# Patient Record
Sex: MALE | Race: WHITE
[De-identification: names, ages, dates, MRNs, and addresses within clinical notes are randomized per-mention and may not be internally consistent; named-entity substitution may affect disease eponyms.]

---

## 2018-06-05 ENCOUNTER — HOSPITAL ENCOUNTER (EMERGENCY)
Dept: HOSPITAL 76 - ED | Age: 23
Discharge: HOME | End: 2018-06-05
Payer: COMMERCIAL

## 2018-06-05 VITALS — DIASTOLIC BLOOD PRESSURE: 85 MMHG | SYSTOLIC BLOOD PRESSURE: 145 MMHG

## 2018-06-05 DIAGNOSIS — R11.2: Primary | ICD-10-CM

## 2018-06-05 DIAGNOSIS — H66.001: ICD-10-CM

## 2018-06-05 PROCEDURE — 99283 EMERGENCY DEPT VISIT LOW MDM: CPT

## 2018-06-05 NOTE — ED PHYSICIAN DOCUMENTATION
PD HPI NVD





- Stated complaint


Stated Complaint: NAUSEA





- Chief complaint


Chief Complaint: General





- History obtained from


History obtained from: Patient





- History of Present Illness


Timing - onset: How many days ago (3-4)


Timing - duration: Days (3-4)


Timing - details: Gradual onset, Still present


Associated symptoms: Other (has had congestion and sore throat, now ear pain 

and today vomiting.)


Contributing factors: No: Sick contact, Bad food, Travel


Worsened by: Moving


Similar symptoms before: Has not had sx before


Recently seen: Not recently seen





Review of Systems


Constitutional: reports: Myalgias.  denies: Fever, Chills


Ears: reports: Ear pain (right)


Nose: reports: Rhinorrhea / runny nose, Congestion


Throat: reports: Sore throat


Respiratory: denies: Cough


GI: reports: Nausea, Vomiting.  denies: Abdominal Pain, Diarrhea


Skin: denies: Rash, Lesions


Neurologic: denies: Altered mental status, Headache





PD PAST MEDICAL HISTORY





- Past Medical History


Past Medical History: No





- Past Surgical History


Past Surgical History: Yes





- Present Medications


Home Medications: 


 Ambulatory Orders











 Medication  Instructions  Recorded  Confirmed


 


Amoxicillin 500 mg PO TID #20 capsule 06/05/18 


 


Cetirizine [ZyrTEC] 10 mg PO DAILY #20 tablet 06/05/18 


 


Dexamethasone [Decadron] 4 mg PO DAILY #5 tablet 06/05/18 


 


Ondansetron Odt [Zofran] 4 mg TL Q6H PRN #15 tablet 06/05/18 














- Allergies


Allergies/Adverse Reactions: 


 Allergies











Allergy/AdvReac Type Severity Reaction Status Date / Time


 


clindamycin Allergy Severe Nausea Verified 04/28/17 16:59














- Social History


Does the pt smoke?: No


Smoking Status: Never smoker


Does the pt drink ETOH?: Yes


ETOH Use: Beer


Does the pt have substance abuse?: No





- Immunizations


Immunizations are current?: Yes





- POLST


Patient has POLST: No





PD ED PE NORMAL





- Vitals


Vital signs reviewed: Yes





- General


General: Alert and oriented X 3, No acute distress, Well developed/nourished





- HEENT


HEENT: PERRL, EOMI (no nystagmus), Pharynx benign.  No: Ears normal (left is 

good; right with redness and bulging. Canal appears okay. )





- Neck


Neck: Supple, no meningeal sign





- Cardiac


Cardiac: RRR, No murmur





- Respiratory


Respiratory: Clear bilaterally





- Abdomen


Abdomen: Soft, Non tender





- Derm


Derm: Normal color, Warm and dry, No rash





- Neuro


Neuro: Alert and oriented X 3, No motor deficit, Normal speech





Results





- Vitals


Vitals: 


 Vital Signs - 24 hr











  06/05/18





  13:43


 


Temperature 36.4 C L


 


Heart Rate 102 H


 


Respiratory 19





Rate 


 


Blood Pressure 145/85 H


 


O2 Saturation 98








 Oxygen











O2 Source                      Room air

















PD MEDICAL DECISION MAKING





- ED course


Complexity details: considered differential, d/w patient





Departure





- Departure


Disposition: 01 Home, Self Care


Clinical Impression: 


Nausea & vomiting


Qualifiers:


 Vomiting type: unspecified Vomiting Intractability: non-intractable Qualified 

Code(s): R11.2 - Nausea with vomiting, unspecified





Right otitis media


Qualifiers:


 Otitis media type: suppurative Chronicity: acute Recurrence: not specified as 

recurrent Spontaneous tympanic membrane rupture: without spontaneous rupture 

Qualified Code(s): H66.001 - Acute suppurative otitis media without spontaneous 

rupture of ear drum, right ear





Condition: Stable


Record reviewed to determine appropriate education?: Yes


Instructions:  ED Otitis Media Acute Adult


Prescriptions: 


Amoxicillin 500 mg PO TID #20 capsule


Cetirizine [ZyrTEC] 10 mg PO DAILY #20 tablet


Dexamethasone [Decadron] 4 mg PO DAILY #5 tablet


Ondansetron Odt [Zofran] 4 mg TL Q6H PRN #15 tablet


 PRN Reason: Nausea / Vomiting


Comments: 


The underlying process may be a viral illness or possibly allergies with the 

congestion and ear pain.  It does look like it led into an ear infection on the 

right side and is likely contributing to the nausea from that.  Rest for 1-2 

days.  Drink lots of fluids.  Ondansetron if needed for nausea.  Decadron 

steroid anti-inflammatory will help with the inflammation and symptoms.  

Amoxicillin 3 times a day for a week for the infection.  Cetirizine daily for 

the next couple of weeks for possible underlying allergies.


Forms:  Activity restrictions


Discharge Date/Time: 06/05/18 15:08

## 2019-07-05 ENCOUNTER — HOSPITAL ENCOUNTER (EMERGENCY)
Dept: HOSPITAL 76 - ED | Age: 24
Discharge: HOME | End: 2019-07-05
Payer: SELF-PAY

## 2019-07-05 VITALS — DIASTOLIC BLOOD PRESSURE: 87 MMHG | SYSTOLIC BLOOD PRESSURE: 141 MMHG

## 2019-07-05 DIAGNOSIS — J01.00: Primary | ICD-10-CM

## 2019-07-05 PROCEDURE — 99283 EMERGENCY DEPT VISIT LOW MDM: CPT

## 2019-07-05 NOTE — ED PHYSICIAN DOCUMENTATION
PD HPI HEENT





- Stated complaint


Stated Complaint: COUGHING UP BLOOD





- Chief complaint


Chief Complaint: Heent





- History obtained from


History obtained from: Patient





- History of Present Illness


Timing - onset: Other (He has been sick for about 5 days with severe right-sided

sinus pressure radiating to the, productive cough with mild shortness of breath 

and sputum that is slightly blood-streaked starting today.  He vacillates as to 

whether or not he is had fevers.)





Review of Systems


Constitutional: reports: Chills, Fatigue.  denies: Sweats


Ears: reports: Ear pain


Nose: reports: Rhinorrhea / runny nose, Congestion, Sinus pressure / pain.  

denies: Epistaxis


Throat: denies: Sore throat





PD PAST MEDICAL HISTORY





- Past Medical History


Past Medical History: No





- Past Surgical History


Past Surgical History: Yes





- Present Medications


Home Medications: 


                                Ambulatory Orders











 Medication  Instructions  Recorded  Confirmed


 


Albuterol Sulf [Ventolin Hfa 1 - 2 puffs INH Q4HR PRN #1 inhaler 07/05/19 





Inhaler]   


 


Amox/Clav 875/125 [Augmentin] 1 each PO Q12H #20 tablet 07/05/19 


 


Guaifenesin/Pseudoephedrne HCl 1 each PO BID PRN #20 tab.er.12h 07/05/19 





[Mucinex D -60 mg Tablet]   


 


predniSONE [Deltasone] 20 mg PO [id number] #21 tab 07/05/19 














- Allergies


Allergies/Adverse Reactions: 


                                    Allergies











Allergy/AdvReac Type Severity Reaction Status Date / Time


 


clindamycin Allergy Severe Nausea Verified 07/05/19 10:52














- Social History


Does the pt smoke?: No


Smoking Status: Never smoker


Does the pt drink ETOH?: Yes


Does the pt have substance abuse?: No





- Immunizations


Immunizations are current?: Yes





- POLST


Patient has POLST: No





PD ED PE NORMAL





- Vitals


Vital signs reviewed: Yes





- General


General: Alert and oriented X 3, No acute distress





- HEENT


HEENT: Other (Moderate right maxillary sinus tenderness, right TM is retracted 

without otitis.  Oropharynx is normal.)





- Neck


Neck: Supple, no meningeal sign, No bony TTP





- Cardiac


Cardiac: RRR, No murmur





- Respiratory


Respiratory: No respiratory distress, Other (Mildly wheezy throughout with good 

air motion, nonlabored)





- Abdomen


Abdomen: Non tender





- Neuro


Neuro: Alert and oriented X 3, Normal speech





Results





- Vitals


Vitals: 


                               Vital Signs - 24 hr











  07/05/19





  10:50


 


Temperature 36.5 C


 


Heart Rate 71


 


Respiratory 16





Rate 


 


Blood Pressure 141/87 H


 


O2 Saturation 98








                                     Oxygen











O2 Source                      Room air

















PD MEDICAL DECISION MAKING





- ED course


ED course: 





He does fit IDSA criteria for antibiotic treatment for sinusitis.





Departure





- Departure


Disposition: 01 Home, Self Care


Clinical Impression: 


Sinusitis


Qualifiers:


 Sinusitis location: maxillary Chronicity: acute Recurrence: non-recurrent 

Qualified Code(s): J01.00 - Acute maxillary sinusitis, unspecified





Condition: Good


Record reviewed to determine appropriate education?: Yes


Health Concerns: 


sinus infection, mild hemoptysis with clinical findings of bronchitis


Plan of Treatment: 


Antibiotics, steroids, and inhaler and a decongestant.  Follow-up with your 

doctor in 1 week if not better.


Care Goals: 


improvement


Assessment: 


as above


Instructions:  ED Sinusitis Abx Tx


Prescriptions: 


Albuterol Sulf [Ventolin Hfa Inhaler] 1 - 2 puffs INH Q4HR PRN #1 inhaler


 PRN Reason: Shortness Of Air/Wheezing


Amox/Clav 875/125 [Augmentin] 1 each PO Q12H #20 tablet


Guaifenesin/Pseudoephedrne HCl [Mucinex D -60 mg Tablet] 1 each PO BID PRN

#20 tab.er.12h


 PRN Reason: congestion


predniSONE [Deltasone] 20 mg PO [id number] #21 tab

## 2022-07-25 ENCOUNTER — HOSPITAL ENCOUNTER (EMERGENCY)
Dept: HOSPITAL 76 - ED | Age: 27
Discharge: HOME | End: 2022-07-25
Payer: COMMERCIAL

## 2022-07-25 VITALS — DIASTOLIC BLOOD PRESSURE: 89 MMHG | SYSTOLIC BLOOD PRESSURE: 145 MMHG

## 2022-07-25 DIAGNOSIS — M70.41: ICD-10-CM

## 2022-07-25 DIAGNOSIS — M25.461: Primary | ICD-10-CM

## 2022-07-25 PROCEDURE — 99282 EMERGENCY DEPT VISIT SF MDM: CPT

## 2022-07-25 PROCEDURE — 20610 DRAIN/INJ JOINT/BURSA W/O US: CPT

## 2022-07-25 NOTE — EXTERNAL MEDICAL SUMMARY RPT
Continuity of Care Document

                            Created on:2022



Patient:VIJAYA CAT

Sex:Male

:1995

External Reference #:[id number]





Demographics







                          Address                   [address] [address]

 

                          Phone                     Unavailable

 

                          Preferred Language        English

 

                          Marital Status            Never 

 

                          Amish Affiliation     Unknown

 

                          Race                      Unknown

 

                          Ethnic Group              Unknown









Author







                          Organization              [facility]

 

                          Address                   [address] [place] [address]

 

                          Phone                     [phone]









Allergies

No information.



Encounters

No information.



Functional Status

No information.



Immunizations

No information.



Medications

No information.



Problems

No information.



Procedures







                     date                description         facility

 

                     [id number]  [facility]







Results/Labs







           test      date      author    facility  value     unit      interpret

ation









                                         Result panel 1









           (unknown)  (no       (unknown)  (unknown)  (no value)  (units    (unk

nown)



                    date)                                   unknown)  

 

           (unknown)  (no       (unknown)  (unknown)  (no value)  (units    (unk

nown)



                    date)                                   unknown)  

 

           (unknown)  (no       (unknown)  (unknown)  [place]  (units    (

unknown)



                    date)                         [id number]     unknown)  

 

           (unknown)  (no       (unknown)  (unknown)  [facility]     (units    (unkno

wn)



                    date)                                   unknown)  

 

           (unknown)  (no       (unknown)  (unknown)  [facility]  (units    

(unknown)



                    date)                                   unknown)  

 

           (unknown)  (no       (unknown)  (unknown)  [facility]  (units    

(unknown)



                    date)                         Visit     unknown)  

 

           (unknown)  (no       (unknown)  (unknown)  (no value)  (units    (unk

nown)



                    date)                                   unknown)  

 

           (unknown)  (no       (unknown)  (unknown)  [id number]  (units    (unkn

own)



                    date)                                   unknown)  

 

           (unknown)  (no       (unknown)  (unknown)  22  (units    (unkno

wn)



                    date)                                   unknown)  

 

           (unknown)  (no       (unknown)  (unknown)  25 Y/O M  (units    (unkno

wn)



                    date)                         presents to unknown)  



                                                  clinic as new           



                                                  patient for           



                                                  infertility           

 

           (unknown)  (no       (unknown)  (unknown)  Age/Sex: 26 / M  (units   

 (unknown)



                    date)                           Date of unknown)  



                                                  Service:            

 

           (unknown)  (no       (unknown)  (unknown)  Allergies  (units    (unkn

own)



                    date)                                   unknown)  

 

           (unknown)  (no       (unknown)  (unknown)  Attending Dr:  (units    (

unknown)



                    date)                         Rosales Xiao MD unknown)  

 

           (unknown)  (no       (unknown)  (unknown)  : 1995  (units   

 (unknown)



                    date)                           Acct:[id number] unknown)  

 

           (unknown)  (no       (unknown)  (unknown)  Dept at   (units    (unkno

wn)



                    date)                         [phone] unknown)  

 

           (unknown)  (no       (unknown)  (unknown)  Documented By:  (units    

(unknown)



                    date)                         Rosales Xiao MD unknown)  



                                                   22 1105           

 

           (unknown)  (no       (unknown)  (unknown)  History of  (units    (unk

nown)



                    date)                         pyloric stenosis unknown)  

 

           (unknown)  (no       (unknown)  (unknown)  Hx of thyroid  (units    (

unknown)



                    date)                         cyst      unknown)  

 

           (unknown)  (no       (unknown)  (unknown)  Intake    (units    (unkno

wn)



                    date)                                   unknown)  

 

           (unknown)  (no       (unknown)  (unknown)  Intake Note:  (units    (u

nknown)



                    date)                                   unknown)  

 

           (unknown)  (no       (unknown)  (unknown)  Intake performed  (units  

  (unknown)



                    date)                         by:       unknown)  



                                                  Gretel Tubbs           

 

           (unknown)  (no       (unknown)  (unknown)  Intake- [facility]  (units  

  (unknown)



                    date)                         Staff     unknown)  

 

           (unknown)  (no       (unknown)  (unknown)  Loc: [facility]  (units    (unkno

wn)



                    date)                                   unknown)  

 

           (unknown)  (no       (unknown)  (unknown)  Medical History  (units   

 (unknown)



                    date)                         (Updated 22 unknown)  



                                                  @ 10:57 by Gretel Tubbs RN)           

 

           (unknown)  (no       (unknown)  (unknown)  Medications  (units    (un

known)



                    date)                                   unknown)  

 

           (unknown)  (no       (unknown)  (unknown)  PFSH      (units    (unkno

wn)



                    date)                                   unknown)  

 

           (unknown)  (no       (unknown)  (unknown)  Patient denies  (units    

(unknown)



                    date)                         medical problems unknown)  

 

           (unknown)  (no       (unknown)  (unknown)  Patient:  (units    (unkno

wn)



                    date)                         Annia,Vijaya J unknown)  



                                                     MR#: M           

 

           (unknown)  (no       (unknown)  (unknown)  Reason For Visit  (units  

  (unknown)



                    date)                                   unknown)  

 

           (unknown)  (no       (unknown)  (unknown)  Signed By:  (units    (unk

nown)



                    date)                                   unknown)  

 

           (unknown)  (no       (unknown)  (unknown)  Smoking Status:  (units   

 (unknown)



                    date)                         Never smoker unknown)  

 

           (unknown)  (no       (unknown)  (unknown)  Smoking Status:  (units   

 (unknown)



                    date)                         Never smoker unknown)  

 

           (unknown)  (no       (unknown)  (unknown)  Social History  (units    

(unknown)



                    date)                         (Updated 22 unknown)  



                                                  @ 11:00 by Gretel Tubbs RN)           

 

           (unknown)  (no       (unknown)  (unknown)  This note may  (units    (

unknown)



                    date)                         have been all or unknown)  



                                                  partially           



                                                  generated using           



                                                  voice recognition           

 

           (unknown)  (no       (unknown)  (unknown)  Tobacco Status  (units    

(unknown)



                    date)                                   unknown)  

 

           (unknown)  (no       (unknown)  (unknown)  Type(s) of  (units    (unk

nown)



                    date)                         exercise: unknown)  



                                                  independent           



                                                  ambulation           

 

           (unknown)  (no       (unknown)  (unknown)  Visit Reasons:  (units    

(unknown)



                    date)                         NP infertility unknown)  

 

           (unknown)  (no       (unknown)  (unknown)  [History  (units    (unkno

wn)



                    date)                         Confirmed unknown)  



                                                  22]           

 

           (unknown)  (no       (unknown)  (unknown)  alcohol intake:  (units   

 (unknown)



                    date)                         current   unknown)  

 

           (unknown)  (no       (unknown)  (unknown)  caffeine:  Yes  (units    

(unknown)



                    date)                                   unknown)  

 

           (unknown)  (no       (unknown)  (unknown)  clindamycin  (units    (un

known)



                    date)                         Allergy (Verified unknown)  



                                                  20 14:48)           

 

           (unknown)  (no       (unknown)  (unknown)  have occurred.  (units    

(unknown)



                    date)                         If there are any unknown)  



                                                  questions, please           



                                                  contact the           



                                                  Medical Records           

 

           (unknown)  (no       (unknown)  (unknown)  marital status:  (units   

 (unknown)



                    date)                            unknown)  

 

           (unknown)  (no       (unknown)  (unknown)  may occur.  (units    (unk

nown)



                    date)                         Occasional unknown)  



                                                  wrong-word or           



                                                  'sound-alike'           



                                                  substitutions may           



                                                  have                

 

           (unknown)  (no       (unknown)  (unknown)  multivit-min-fol  (units  

  (unknown)



                    date)                         ic-vit K-lycop unknown)  



                                                  [One-A-Day Men's           



                                                  Multivitamin] PO           



                                                  22            

 

           (unknown)  (no       (unknown)  (unknown)  number of  (units    (unkn

own)



                    date)                         children:  0 unknown)  

 

           (unknown)  (no       (unknown)  (unknown)  occupational  (units    (u

nknown)



                    date)                         status:  employed unknown)  

 

           (unknown)  (no       (unknown)  (unknown)  occurred due to  (units   

 (unknown)



                    date)                         the inherent unknown)  



                                                  limitations of           



                                                  voice recognition           



                                                  software. Please           

 

           (unknown)  (no       (unknown)  (unknown)  read the note  (units    (

unknown)



                    date)                         carefully and unknown)  



                                                  recognize, using           



                                                  context, where           



                                                  these               



                                                  substitutions           

 

           (unknown)  (no       (unknown)  (unknown)  software.  (units    (unkn

own)



                    date)                         Although every unknown)  



                                                  effort is made to           



                                                  edit content,           



                                                  transcription           



                                                  errors              









                                         Result panel 2









           (unknown)  (no       (unknown)  (unknown)  (no value)  (units    (unk

nown)



                    date)                                   unknown)  

 

           (unknown)  (no       (unknown)  (unknown)  Orders:   (units    (unkno

wn)



                    date)                                   unknown)  

 

           (unknown)  (no       (unknown)  (unknown)  (no value)  (units    (unk

nown)



                    date)                                   unknown)  

 

           (unknown)  (no       (unknown)  (unknown)  (no value)  (units    (unk

nown)



                    date)                                   unknown)  

 

           (unknown)  (no       (unknown)  (unknown)  22  (units    (unkno

wn)



                    date)                                   unknown)  

 

           (unknown)  (no       (unknown)  (unknown)  11:15     (units    (unkno

wn)



                    date)                                   unknown)  

 

           (unknown)  (no       (unknown)  (unknown)  [place]  (units    (

unknown)



                    date)                         [id number]     unknown)  

 

           (unknown)  (no       (unknown)  (unknown)  [facility]     (units    (unkno

wn)



                    date)                                   unknown)  

 

           (unknown)  (no       (unknown)  (unknown)  [facility]  (units    

(unknown)



                    date)                                   unknown)  

 

           (unknown)  (no       (unknown)  (unknown)  [facility]  (units    

(unknown)



                    date)                         Visit     unknown)  

 

           (unknown)  (no       (unknown)  (unknown)  (no value)  (units    (unk

nown)



                    date)                                   unknown)  

 

           (unknown)  (no       (unknown)  (unknown)  Urine Appearance  (units  

  (unknown)



                    date)                            Clear     Last unknown)  



                                                  Edit by Gabriela Schwartz RN on           



                                                  22 11:14           

 

           (unknown)  (no       (unknown)  (unknown)  Urine Bilirubin  (units   

 (unknown)



                    date)                         Negative     Last unknown)  



                                                  Edit by Gabriela Schwartz RN on           



                                                  22 11:14           

 

           (unknown)  (no       (unknown)  (unknown)  Urine Blood  (units    (un

known)



                    date)                         Negative     Last unknown)  



                                                  Edit by Gabriela Schwartz RN on           



                                                  22 11:14           

 

           (unknown)  (no       (unknown)  (unknown)  Urine Color  (units    (un

known)



                    date)                           Yellow     Last unknown)  



                                                  Edit by Gabriela Schwartz RN on           



                                                  22 11:14           

 

           (unknown)  (no       (unknown)  (unknown)  Urine Glucose  (units    (

unknown)



                    date)                         Negative  mg/dL unknown)  



                                                  Last Edit by           



                                                  Gabriela Schwartz RN on 22           



                                                  11:                 

 

           (unknown)  (no       (unknown)  (unknown)  Urine Ketones  (units    (

unknown)



                    date)                         Negative     Last unknown)  



                                                  Edit by Gabriela Schwartz RN on           



                                                  22 11:14           

 

           (unknown)  (no       (unknown)  (unknown)  Urine Leukocyte  (units   

 (unknown)



                    date)                         Esterase Negative unknown)  



                                                      Last Edit by           



                                                  Gabriela Schwartz RN on            

 

           (unknown)  (no       (unknown)  (unknown)  Urine Nitrate  (units    (

unknown)



                    date)                         Negative     Last unknown)  



                                                  Edit by Gabriela Schwartz RN on           



                                                  22 11:14           

 

           (unknown)  (no       (unknown)  (unknown)  Urine Protein  (units    (

unknown)



                    date)                         Negative     Last unknown)  



                                                  Edit by Gabriela Schwartz RN on           



                                                  22 11:14           

 

           (unknown)  (no       (unknown)  (unknown)  Urine Specific  (units    

(unknown)



                    date)                         Gravity 1.015 unknown)  



                                                  Last Edit by           



                                                  Gabriela Schwartz RN on 22 11           

 

           (unknown)  (no       (unknown)  (unknown)  Urine     (units    (unkno

wn)



                    date)                         Urobilinogen - unknown)  



                                                  0.2 mg/dL           



                                                  Last Edit by           



                                                  Gabriela Schwartz RN on            

 

           (unknown)  (no       (unknown)  (unknown)  Urine pH    7.5  (units   

 (unknown)



                    date)                            Last Edit by unknown)  



                                                  Gabriela Schwartz RN on 22           



                                                  11:14               

 

           (unknown)  (no       (unknown)  (unknown)  [id number]  (units    (unkn

own)



                    date)                                   unknown)  

 

           (unknown)  (no       (unknown)  (unknown)  22  (units    (unkno

wn)



                    date)                                   unknown)  

 

           (unknown)  (no       (unknown)  (unknown)  11:14     (units    (unkno

wn)



                    date)                                   unknown)  

 

           (unknown)  (no       (unknown)  (unknown)  14        (units    (unkno

wn)



                    date)                                   unknown)  

 

           (unknown)  (no       (unknown)  (unknown)  2         (units    (unkno

wn)



                    date)                                   unknown)  

 

           (unknown)  (no       (unknown)  (unknown)  22        (units    (unkno

wn)



                    date)                                   unknown)  

 

           (unknown)  (no       (unknown)  (unknown)  25 Y/O M  (units    (unkno

wn)



                    date)                         presents to unknown)  



                                                  clinic as new           



                                                  patient for           



                                                  infertility           

 

           (unknown)  (no       (unknown)  (unknown)  :14       (units    (unkno

wn)



                    date)                                   unknown)  

 

           (unknown)  (no       (unknown)  (unknown)  Age/Sex: 26 / M  (units   

 (unknown)



                    date)                           Date of unknown)  



                                                  Service:            

 

           (unknown)  (no       (unknown)  (unknown)  Allergies  (units    (unkn

own)



                    date)                                   unknown)  

 

           (unknown)  (no       (unknown)  (unknown)  Assessment +  (units    (u

nknown)



                    date)                         Plan      unknown)  

 

           (unknown)  (no       (unknown)  (unknown)  Attending Dr:  (units    (

unknown)



                    date)                         Rosales Xiao MD unknown)  

 

           (unknown)  (no       (unknown)  (unknown)  BMI    25.7  (units    (un

known)



                    date)                                   unknown)  

 

           (unknown)  (no       (unknown)  (unknown)  BP    131/81  (units    (u

nknown)



                    date)                                   unknown)  

 

           (unknown)  (no       (unknown)  (unknown)  Blood Pressure  (units    

(unknown)



                    date)                         Location    Lt unknown)  



                                                  brachial            

 

           (unknown)  (no       (unknown)  (unknown)  : 1995  (units   

 (unknown)



                    date)                           Acct:[id number] unknown)  

 

           (unknown)  (no       (unknown)  (unknown)  Dept at   (units    (unkno

wn)



                    date)                         [phone]. unknown)  

 

           (unknown)  (no       (unknown)  (unknown)  Documented By:  (units    

(unknown)



                    date)                         Rosales Xiao MD unknown)  



                                                   22 1105           

 

           (unknown)  (no       (unknown)  (unknown)  Height    6 ft 1  (units  

  (unknown)



                    date)                         in        unknown)  

 

           (unknown)  (no       (unknown)  (unknown)  History of  (units    (unk

nown)



                    date)                         pyloric stenosis unknown)  

 

           (unknown)  (no       (unknown)  (unknown)  Hx of thyroid  (units    (

unknown)



                    date)                         cyst      unknown)  

 

           (unknown)  (no       (unknown)  (unknown)  Intake    (units    (unkno

wn)



                    date)                                   unknown)  

 

           (unknown)  (no       (unknown)  (unknown)  Intake Note:  (units    (u

nknown)



                    date)                                   unknown)  

 

           (unknown)  (no       (unknown)  (unknown)  Intake performed  (units  

  (unknown)



                    date)                         by:       unknown)  



                                                  Gretel Tubbs           

 

           (unknown)  (no       (unknown)  (unknown)  Intake- Clincial  (units  

  (unknown)



                    date)                         Staff     unknown)  

 

           (unknown)  (no       (unknown)  (unknown)  Loc: [facility]  (units    (unkno

wn)



                    date)                                   unknown)  

 

           (unknown)  (no       (unknown)  (unknown)  Medical History  (units   

 (unknown)



                    date)                         (Updated 22 unknown)  



                                                  @ 10:57 by Gretel Tubbs RN)           

 

           (unknown)  (no       (unknown)  (unknown)  Medications  (units    (un

known)



                    date)                                   unknown)  

 

           (unknown)  (no       (unknown)  (unknown)  Orders    (units    (unkno

wn)



                    date)                                   unknown)  

 

           (unknown)  (no       (unknown)  (unknown)  Oxygen Delivery  (units   

 (unknown)



                    date)                         Method    room unknown)  



                                                  air                 

 

           (unknown)  (no       (unknown)  (unknown)  PFSH      (units    (unkno

wn)



                    date)                                   unknown)  

 

           (unknown)  (no       (unknown)  (unknown)  POC Urine Dip  (units    (

unknown)



                    date)                         Today R30.0 - unknown)  



                                                  Dysuria             

 

           (unknown)  (no       (unknown)  (unknown)  Patient denies  (units    

(unknown)



                    date)                         medical problems unknown)  

 

           (unknown)  (no       (unknown)  (unknown)  Patient:  (units    (unkno

wn)



                    date)                         Vijaya Cat J unknown)  



                                                     MR#: M           

 

           (unknown)  (no       (unknown)  (unknown)  Position  (units    (unkno

wn)



                    date)                         Sitting   unknown)  

 

           (unknown)  (no       (unknown)  (unknown)  Pulse    64  (units    (un

known)



                    date)                                   unknown)  

 

           (unknown)  (no       (unknown)  (unknown)  Pulse Oximetry  (units    

(unknown)



                    date)                         (%)    100 unknown)  

 

           (unknown)  (no       (unknown)  (unknown)  Pulse Source  (units    (u

nknown)



                    date)                         Monitor   unknown)  

 

           (unknown)  (no       (unknown)  (unknown)  Reason For Visit  (units  

  (unknown)



                    date)                                   unknown)  

 

           (unknown)  (no       (unknown)  (unknown)  Respiration  (units    (un

known)



                    date)                         16        unknown)  

 

           (unknown)  (no       (unknown)  (unknown)  Results   (units    (unkno

wn)



                    date)                                   unknown)  

 

           (unknown)  (no       (unknown)  (unknown)  Signed By:  (units    (unk

nown)



                    date)                                   unknown)  

 

           (unknown)  (no       (unknown)  (unknown)  Smoking Status:  (units   

 (unknown)



                    date)                         Never smoker unknown)  

 

           (unknown)  (no       (unknown)  (unknown)  Smoking Status:  (units   

 (unknown)



                    date)                         Never smoker unknown)  

 

           (unknown)  (no       (unknown)  (unknown)  Social History  (units    

(unknown)



                    date)                         (Updated 22 unknown)  



                                                  @ 11:00 by Gretel Tubbs RN)           

 

           (unknown)  (no       (unknown)  (unknown)  This note may  (units    (

unknown)



                    date)                         have been all or unknown)  



                                                  partially           



                                                  generated using           



                                                  voice recognition           

 

           (unknown)  (no       (unknown)  (unknown)  Tobacco Status  (units    

(unknown)



                    date)                                   unknown)  

 

           (unknown)  (no       (unknown)  (unknown)  Type(s) of  (units    (unk

nown)



                    date)                         exercise: unknown)  



                                                  independent           



                                                  ambulation           

 

           (unknown)  (no       (unknown)  (unknown)  Urine Dipstick  (units    

(unknown)



                    date)                                   unknown)  

 

           (unknown)  (no       (unknown)  (unknown)  Visit Reasons:  (units    

(unknown)



                    date)                         NP infertility unknown)  

 

           (unknown)  (no       (unknown)  (unknown)  Vitals    (units    (unkno

wn)



                    date)                                   unknown)  

 

           (unknown)  (no       (unknown)  (unknown)  Weight    195 lb  (units  

  (unknown)



                    date)                                   unknown)  

 

           (unknown)  (no       (unknown)  (unknown)  [History  (units    (unkno

wn)



                    date)                         Confirmed unknown)  



                                                  22]           

 

           (unknown)  (no       (unknown)  (unknown)  alcohol intake:  (units   

 (unknown)



                    date)                         current   unknown)  

 

           (unknown)  (no       (unknown)  (unknown)  caffeine:  Yes  (units    

(unknown)



                    date)                                   unknown)  

 

           (unknown)  (no       (unknown)  (unknown)  clindamycin  (units    (un

known)



                    date)                         Allergy (Verified unknown)  



                                                  22 11:10)           

 

           (unknown)  (no       (unknown)  (unknown)  have occurred.  (units    

(unknown)



                    date)                         If there are any unknown)  



                                                  questions, please           



                                                  contact the           



                                                  Medical Records           

 

           (unknown)  (no       (unknown)  (unknown)  marital status:  (units   

 (unknown)



                    date)                            unknown)  

 

           (unknown)  (no       (unknown)  (unknown)  may occur.  (units    (unk

nown)



                    date)                         Occasional unknown)  



                                                  wrong-word or           



                                                  'sound-alike'           



                                                  substitutions may           



                                                  have                

 

           (unknown)  (no       (unknown)  (unknown)  multivit-min-fol  (units  

  (unknown)



                    date)                         ic-vit K-lycop unknown)  



                                                  [One-A-Day Men's           



                                                  Multivitamin] PO           



                                                  22            

 

           (unknown)  (no       (unknown)  (unknown)  number of  (units    (unkn

own)



                    date)                         children:  0 unknown)  

 

           (unknown)  (no       (unknown)  (unknown)  occupational  (units    (u

nknown)



                    date)                         status:  employed unknown)  

 

           (unknown)  (no       (unknown)  (unknown)  occurred due to  (units   

 (unknown)



                    date)                         the inherent unknown)  



                                                  limitations of           



                                                  voice recognition           



                                                  software. Please           

 

           (unknown)  (no       (unknown)  (unknown)  read the note  (units    (

unknown)



                    date)                         carefully and unknown)  



                                                  recognize, using           



                                                  context, where           



                                                  these               



                                                  substitutions           

 

           (unknown)  (no       (unknown)  (unknown)  software.  (units    (unkn

own)



                    date)                         Although every unknown)  



                                                  effort is made to           



                                                  edit content,           



                                                  transcription           



                                                  errors              









                                         Result panel 3









           (unknown)  (no       (unknown)  (unknown)  (no value)  (units    (unk

nown)



                    date)                                   unknown)  

 

           (unknown)  (no       (unknown)  (unknown)  Orders:   (units    (unkno

wn)



                    date)                                   unknown)  

 

           (unknown)  (no       (unknown)  (unknown)  (no value)  (units    (unk

nown)



                    date)                                   unknown)  

 

           (unknown)  (no       (unknown)  (unknown)  (no value)  (units    (unk

nown)



                    date)                                   unknown)  

 

           (unknown)  (no       (unknown)  (unknown)  22  (units    (unkno

wn)



                    date)                                   unknown)  

 

           (unknown)  (no       (unknown)  (unknown)  11:15     (units    (unkno

wn)



                    date)                                   unknown)  

 

           (unknown)  (no       (unknown)  (unknown)  [place]  (units    (

unknown)



                    date)                         [id number]     unknown)  

 

           (unknown)  (no       (unknown)  (unknown)  [employer]     (units    (unkno

wn)



                    date)                                   unknown)  

 

           (unknown)  (no       (unknown)  (unknown)  [facility]  (units    

(unknown)



                    date)                                   unknown)  

 

           (unknown)  (no       (unknown)  (unknown)  [facility]  (units    

(unknown)



                    date)                         Visit     unknown)  

 

           (unknown)  (no       (unknown)  (unknown)  (no value)  (units    (unk

nown)



                    date)                                   unknown)  

 

           (unknown)  (no       (unknown)  (unknown)  Urine Appearance  (units  

  (unknown)



                    date)                            Clear     Last unknown)  



                                                  Edit by Gabriela Schwartz RN on           



                                                  22 11:14           

 

           (unknown)  (no       (unknown)  (unknown)  Urine Bilirubin  (units   

 (unknown)



                    date)                         Negative     Last unknown)  



                                                  Edit by Gabriela Schwartz RN on           



                                                  22 11:14           

 

           (unknown)  (no       (unknown)  (unknown)  Urine Blood  (units    (un

known)



                    date)                         Negative     Last unknown)  



                                                  Edit by Gabriela Schwartz RN on           



                                                  22 11:14           

 

           (unknown)  (no       (unknown)  (unknown)  Urine Color  (units    (un

known)



                    date)                           Yellow     Last unknown)  



                                                  Edit by Gabriela Schwartz RN on           



                                                  22 11:14           

 

           (unknown)  (no       (unknown)  (unknown)  Urine Glucose  (units    (

unknown)



                    date)                         Negative  mg/dL unknown)  



                                                  Last Edit by           



                                                  Gabriela Schwartz RN on 22           



                                                  11:                 

 

           (unknown)  (no       (unknown)  (unknown)  Urine Ketones  (units    (

unknown)



                    date)                         Negative     Last unknown)  



                                                  Edit by Gabriela Schwartz RN on           



                                                  22 11:14           

 

           (unknown)  (no       (unknown)  (unknown)  Urine Leukocyte  (units   

 (unknown)



                    date)                         Esterase Negative unknown)  



                                                      Last Edit by           



                                                  Gabriela Schwartz RN on            

 

           (unknown)  (no       (unknown)  (unknown)  Urine Nitrate  (units    (

unknown)



                    date)                         Negative     Last unknown)  



                                                  Edit by Gabriela Schwartz RN on           



                                                  22 11:14           

 

           (unknown)  (no       (unknown)  (unknown)  Urine Protein  (units    (

unknown)



                    date)                         Negative     Last unknown)  



                                                  Edit by Gabriela Schwartz RN on           



                                                  22 11:14           

 

           (unknown)  (no       (unknown)  (unknown)  Urine Specific  (units    

(unknown)



                    date)                         Gravity 1.015 unknown)  



                                                  Last Edit by           



                                                  Gabriela Schwartz RN on 22 11           

 

           (unknown)  (no       (unknown)  (unknown)  Urine     (units    (unkno

wn)



                    date)                         Urobilinogen - unknown)  



                                                  0.2 mg/dL           



                                                  Last Edit by           



                                                  Gabriela Schwartz RN on            

 

           (unknown)  (no       (unknown)  (unknown)  Urine pH    7.5  (units   

 (unknown)



                    date)                            Last Edit by unknown)  



                                                  Gabriela Schwartz RN on 22           



                                                  11:14               

 

           (unknown)  (no       (unknown)  (unknown)  [id number]  (units    (unkn

own)



                    date)                                   unknown)  

 

           (unknown)  (no       (unknown)  (unknown)  22  (units    (unkno

wn)



                    date)                                   unknown)  

 

           (unknown)  (no       (unknown)  (unknown)  11:14     (units    (unkno

wn)



                    date)                                   unknown)  

 

           (unknown)  (no       (unknown)  (unknown)  14        (units    (unkno

wn)



                    date)                                   unknown)  

 

           (unknown)  (no       (unknown)  (unknown)  2         (units    (unkno

wn)



                    date)                                   unknown)  

 

           (unknown)  (no       (unknown)  (unknown)  22        (units    (unkno

wn)



                    date)                                   unknown)  

 

           (unknown)  (no       (unknown)  (unknown)  25 Y/O M  (units    (unkno

wn)



                    date)                         presents to unknown)  



                                                  clinic as new           



                                                  patient for           



                                                  infertility           

 

           (unknown)  (no       (unknown)  (unknown)  :14       (units    (unkno

wn)



                    date)                                   unknown)  

 

           (unknown)  (no       (unknown)  (unknown)  Abnormal semen  (units    

(unknown)



                    date)                         analysis  unknown)  

 

           (unknown)  (no       (unknown)  (unknown)  Age/Sex: 26 / M  (units   

 (unknown)



                    date)                           Date of unknown)  



                                                  Service:            

 

           (unknown)  (no       (unknown)  (unknown)  Allergies  (units    (unkn

own)



                    date)                                   unknown)  

 

           (unknown)  (no       (unknown)  (unknown)  Assessment +  (units    (u

nknown)



                    date)                         Plan      unknown)  

 

           (unknown)  (no       (unknown)  (unknown)  Attending Dr:  (units    (

unknown)



                    date)                         Rosales Xiao MD unknown)  

 

           (unknown)  (no       (unknown)  (unknown)  BMI    25.7  (units    (un

known)



                    date)                                   unknown)  

 

           (unknown)  (no       (unknown)  (unknown)  BP    131/81  (units    (u

nknown)



                    date)                                   unknown)  

 

           (unknown)  (no       (unknown)  (unknown)  Blood Pressure  (units    

(unknown)



                    date)                         Location    Lt unknown)  



                                                  brachial            

 

           (unknown)  (no       (unknown)  (unknown)  Chief Complaint  (units   

 (unknown)



                    date)                                   unknown)  

 

           (unknown)  (no       (unknown)  (unknown)  Chief Complaint:  (units  

  (unknown)



                    date)                         Abnormal sperm unknown)  



                                                  motility/infertil           



                                                  ity                 

 

           (unknown)  (no       (unknown)  (unknown)  : 1995  (units   

 (unknown)



                    date)                           Acct:[id number] unknown)  

 

           (unknown)  (no       (unknown)  (unknown)  Dept at   (units    (unkno

wn)



                    date)                         [phone] unknown)  

 

           (unknown)  (no       (unknown)  (unknown)  Details:  (units    (unkno

wn)



                    date)                                   unknown)  

 

           (unknown)  (no       (unknown)  (unknown)  Documented By:  (units    

(unknown)



                    date)                         Rosales Xiao MD unknown)  



                                                   22 1105           

 

           (unknown)  (no       (unknown)  (unknown)  HPI       (units    (unkno

wn)



                    date)                                   unknown)  

 

           (unknown)  (no       (unknown)  (unknown)  Height    6 ft 1  (units  

  (unknown)



                    date)                         in        unknown)  

 

           (unknown)  (no       (unknown)  (unknown)  History of  (units    (unk

nown)



                    date)                         pyloric stenosis unknown)  

 

           (unknown)  (no       (unknown)  (unknown)  Hx of thyroid  (units    (

unknown)



                    date)                         cyst      unknown)  

 

           (unknown)  (no       (unknown)  (unknown)  Intake    (units    (unkno

wn)



                    date)                                   unknown)  

 

           (unknown)  (no       (unknown)  (unknown)  Intake Note:  (units    (u

nknown)



                    date)                                   unknown)  

 

           (unknown)  (no       (unknown)  (unknown)  Intake performed  (units  

  (unknown)



                    date)                         by:       unknown)  



                                                  Gretel Tubbs           

 

           (unknown)  (no       (unknown)  (unknown)  Intake- Clincial  (units  

  (unknown)



                    date)                         Staff     unknown)  

 

           (unknown)  (no       (unknown)  (unknown)  Loc: [facility]  (units    (unkno

wn)



                    date)                                   unknown)  

 

           (unknown)  (no       (unknown)  (unknown)  Medical History  (units   

 (unknown)



                    date)                         (Updated 22 unknown)  



                                                  @ 14:20 by Rosales Xiao MD)           

 

           (unknown)  (no       (unknown)  (unknown)  Medications  (units    (un

known)



                    date)                                   unknown)  

 

           (unknown)  (no       (unknown)  (unknown)  Orders    (units    (unkno

wn)



                    date)                                   unknown)  

 

           (unknown)  (no       (unknown)  (unknown)  Oxygen Delivery  (units   

 (unknown)



                    date)                         Method    room unknown)  



                                                  air                 

 

           (unknown)  (no       (unknown)  (unknown)  PFSH      (units    (unkno

wn)



                    date)                                   unknown)  

 

           (unknown)  (no       (unknown)  (unknown)  POC Urine Dip  (units    (

unknown)



                    date)                         22 R30.0 - unknown)  



                                                  Dysuria             

 

           (unknown)  (no       (unknown)  (unknown)  Patient denies  (units    

(unknown)



                    date)                         medical problems unknown)  

 

           (unknown)  (no       (unknown)  (unknown)  Patient:  (units    (unkno

wn)



                    date)                         Vijaya Cat J unknown)  



                                                     MR#: M           

 

           (unknown)  (no       (unknown)  (unknown)  Position  (units    (unkno

wn)



                    date)                         Sitting   unknown)  

 

           (unknown)  (no       (unknown)  (unknown)  Pulse    64  (units    (un

known)



                    date)                                   unknown)  

 

           (unknown)  (no       (unknown)  (unknown)  Pulse Oximetry  (units    

(unknown)



                    date)                         (%)    100 unknown)  

 

           (unknown)  (no       (unknown)  (unknown)  Pulse Source  (units    (u

nknown)



                    date)                         Monitor   unknown)  

 

           (unknown)  (no       (unknown)  (unknown)  Reason For Visit  (units  

  (unknown)



                    date)                                   unknown)  

 

           (unknown)  (no       (unknown)  (unknown)  Respiration  (units    (un

known)



                    date)                         16        unknown)  

 

           (unknown)  (no       (unknown)  (unknown)  Results   (units    (unkno

wn)



                    date)                                   unknown)  

 

           (unknown)  (no       (unknown)  (unknown)  Semen Analysis -  (units  

  (unknown)



                    date)                         Fertility 1 Week unknown)  



                                                  R86.9 -             



                                                  Unspecified           



                                                  abnormal finding           



                                                  in                  

 

           (unknown)  (no       (unknown)  (unknown)  Semen Analysis -  (units  

  (unknown)



                    date)                         Fertility 3 Weeks unknown)  



                                                  R86.9 -             



                                                  Unspecified           



                                                  abnormal finding           



                                                  in                  

 

           (unknown)  (no       (unknown)  (unknown)  Signed By:  (units    (unk

nown)



                    date)                                   unknown)  

 

           (unknown)  (no       (unknown)  (unknown)  Smoking Status:  (units   

 (unknown)



                    date)                         Never smoker unknown)  

 

           (unknown)  (no       (unknown)  (unknown)  Smoking Status:  (units   

 (unknown)



                    date)                         Never smoker unknown)  

 

           (unknown)  (no       (unknown)  (unknown)  Social History  (units    

(unknown)



                    date)                         (Updated 22 unknown)  



                                                  @ 11:00 by Gretel Tubbs RN)           

 

           (unknown)  (no       (unknown)  (unknown)  This 26-year-old  (units  

  (unknown)



                    date)                         male comes unknown)  



                                                  [facility]           



                                                  a new patient           



                                                  with complaint of           

 

           (unknown)  (no       (unknown)  (unknown)  This note may  (units    (

unknown)



                    date)                         have been all or unknown)  



                                                  partially           



                                                  generated using           



                                                  voice recognition           

 

           (unknown)  (no       (unknown)  (unknown)  Tobacco Status  (units    

(unknown)



                    date)                                   unknown)  

 

           (unknown)  (no       (unknown)  (unknown)  Type(s) of  (units    (unk

nown)



                    date)                         exercise: unknown)  



                                                  independent           



                                                  ambulation           

 

           (unknown)  (no       (unknown)  (unknown)  Urine Dipstick  (units    

(unknown)



                    date)                                   unknown)  

 

           (unknown)  (no       (unknown)  (unknown)  Visit Reasons:  (units    

(unknown)



                    date)                         NP infertility unknown)  

 

           (unknown)  (no       (unknown)  (unknown)  Vitals    (units    (unkno

wn)



                    date)                                   unknown)  

 

           (unknown)  (no       (unknown)  (unknown)  Weight    195 lb  (units  

  (unknown)



                    date)                                   unknown)  

 

           (unknown)  (no       (unknown)  (unknown)  [History  (units    (unkno

wn)



                    date)                         Confirmed unknown)  



                                                  22]           

 

           (unknown)  (no       (unknown)  (unknown)  abnormal sperm  (units    

(unknown)



                    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  



                                                  infertility.           

 

           (unknown)  (no       (unknown)  (unknown)  alcohol intake:  (units   

 (unknown)



                    date)                         current   unknown)  

 

           (unknown)  (no       (unknown)  (unknown)  caffeine:  Yes  (units    

(unknown)



                    date)                                   unknown)  

 

           (unknown)  (no       (unknown)  (unknown)  clindamycin  (units    (un

known)



                    date)                         Allergy (Verified unknown)  



                                                  22 11:10)           

 

           (unknown)  (no       (unknown)  (unknown)  have occurred.  (units    

(unknown)



                    date)                         If there are any unknown)  



                                                  questions, please           



                                                  contact the           



                                                  Medical Records           

 

           (unknown)  (no       (unknown)  (unknown)  marital status:  (units   

 (unknown)



                    date)                            unknown)  

 

           (unknown)  (no       (unknown)  (unknown)  may occur.  (units    (unk

nown)



                    date)                         Occasional unknown)  



                                                  wrong-word or           



                                                  'sound-alike'           



                                                  substitutions may           



                                                  have                

 

           (unknown)  (no       (unknown)  (unknown)  multivit-min-fol  (units  

  (unknown)



                    date)                         ic-vit K-lycop unknown)  



                                                  [One-A-Day Men's           



                                                  Multivitamin] PO           



                                                  22            

 

           (unknown)  (no       (unknown)  (unknown)  number of  (units    (unkn

own)



                    date)                         children:  0 unknown)  

 

           (unknown)  (no       (unknown)  (unknown)  occupational  (units    (u

nknown)



                    date)                         status:  employed unknown)  

 

           (unknown)  (no       (unknown)  (unknown)  occurred due to  (units   

 (unknown)



                    date)                         the inherent unknown)  



                                                  limitations of           



                                                  voice recognition           



                                                  software. Please           

 

           (unknown)  (no       (unknown)  (unknown)  read the note  (units    (

unknown)



                    date)                         carefully and unknown)  



                                                  recognize, using           



                                                  context, where           



                                                  these               



                                                  substitutions           

 

           (unknown)  (no       (unknown)  (unknown)  software.  (units    (unkn

own)



                    date)                         Although every unknown)  



                                                  effort is made to           



                                                  edit content,           



                                                  transcription           



                                                  errors              

 

           (unknown)  (no       (unknown)  (unknown)  specimens from  (units    

(unknown)



                    date)                         male genital unknown)  



                                                  organs              









                                         Result panel 4









           (unknown)  (no       (unknown)  (unknown)  (no value)  (units    (unk

nown)



                    date)                                   unknown)  

 

           (unknown)  (no       (unknown)  (unknown)  Orders:   (units    (unkno

wn)



                    date)                                   unknown)  

 

           (unknown)  (no       (unknown)  (unknown)  (no value)  (units    (unk

nown)



                    date)                                   unknown)  

 

           (unknown)  (no       (unknown)  (unknown)  (no value)  (units    (unk

nown)



                    date)                                   unknown)  

 

           (unknown)  (no       (unknown)  (unknown)  22  (units    (unkno

wn)



                    date)                                   unknown)  

 

           (unknown)  (no       (unknown)  (unknown)  11:15     (units    (unkno

wn)



                    date)                                   unknown)  

 

           (unknown)  (no       (unknown)  (unknown)  DANE Gaines  (units    (

unknown)



                    date)                         [id number]     unknown)  

 

           (unknown)  (no       (unknown)  (unknown)  [employer]     (units    (unkno

wn)



                    date)                                   unknown)  

 

           (unknown)  (no       (unknown)  (unknown)  [facility]  (units    

(unknown)



                    date)                                   unknown)  

 

           (unknown)  (no       (unknown)  (unknown)  [facility]  (units    

(unknown)



                    date)                         Visit     unknown)  

 

           (unknown)  (no       (unknown)  (unknown)  (no value)  (units    (unk

nown)



                    date)                                   unknown)  

 

           (unknown)  (no       (unknown)  (unknown)  Urine Appearance  (units  

  (unknown)



                    date)                            Clear     Last unknown)  



                                                  Edit by Gabriela Schwartz RN on           



                                                  22 11:14           

 

           (unknown)  (no       (unknown)  (unknown)  Urine Bilirubin  (units   

 (unknown)



                    date)                         Negative     Last unknown)  



                                                  Edit by Gabriela Schwartz RN on           



                                                  22 11:14           

 

           (unknown)  (no       (unknown)  (unknown)  Urine Blood  (units    (un

known)



                    date)                         Negative     Last unknown)  



                                                  Edit by Gabriela Schwartz RN on           



                                                  22 11:14           

 

           (unknown)  (no       (unknown)  (unknown)  Urine Color  (units    (un

known)



                    date)                           Yellow     Last unknown)  



                                                  Edit by Gabriela Schwartz RN on           



                                                  22 11:14           

 

           (unknown)  (no       (unknown)  (unknown)  Urine Glucose  (units    (

unknown)



                    date)                         Negative  mg/dL unknown)  



                                                  Last Edit by           



                                                  Gabriela Schwartz RN on 22           



                                                  11:                 

 

           (unknown)  (no       (unknown)  (unknown)  Urine Ketones  (units    (

unknown)



                    date)                         Negative     Last unknown)  



                                                  Edit by Gabriela Schwartz RN on           



                                                  22 11:14           

 

           (unknown)  (no       (unknown)  (unknown)  Urine Leukocyte  (units   

 (unknown)



                    date)                         Esterase Negative unknown)  



                                                      Last Edit by           



                                                  Gabriela Schwartz RN on            

 

           (unknown)  (no       (unknown)  (unknown)  Urine Nitrate  (units    (

unknown)



                    date)                         Negative     Last unknown)  



                                                  Edit by Gabriela Schwartz RN on           



                                                  22 11:14           

 

           (unknown)  (no       (unknown)  (unknown)  Urine Protein  (units    (

unknown)



                    date)                         Negative     Last unknown)  



                                                  Edit by Gabriela Schwartz RN on           



                                                  22 11:14           

 

           (unknown)  (no       (unknown)  (unknown)  Urine Specific  (units    

(unknown)



                    date)                         Gravity 1.015 unknown)  



                                                  Last Edit by           



                                                  Gabriela Schwartz RN on 22 11           

 

           (unknown)  (no       (unknown)  (unknown)  Urine     (units    (unkno

wn)



                    date)                         Urobilinogen - unknown)  



                                                  0.2 mg/dL           



                                                  Last Edit by           



                                                  Gabriela Schwartz RN on            

 

           (unknown)  (no       (unknown)  (unknown)  Urine pH    7.5  (units   

 (unknown)



                    date)                            Last Edit by unknown)  



                                                  Gabriela Schwartz RN on 22           



                                                  11:14               

 

           (unknown)  (no       (unknown)  (unknown)  [id number]  (units    (unkn

own)



                    date)                                   unknown)  

 

           (unknown)  (no       (unknown)  (unknown)  22  (units    (unkno

wn)



                    date)                                   unknown)  

 

           (unknown)  (no       (unknown)  (unknown)  11:14     (units    (unkno

wn)



                    date)                                   unknown)  

 

           (unknown)  (no       (unknown)  (unknown)  14        (units    (unkno

wn)



                    date)                                   unknown)  

 

           (unknown)  (no       (unknown)  (unknown)  2         (units    (unkno

wn)



                    date)                                   unknown)  

 

           (unknown)  (no       (unknown)  (unknown)  22        (units    (unkno

wn)



                    date)                                   unknown)  

 

           (unknown)  (no       (unknown)  (unknown)  25 Y/O M  (units    (unkno

wn)



                    date)                         presents to unknown)  



                                                  clinic as new           



                                                  patient for           



                                                  infertility           

 

           (unknown)  (no       (unknown)  (unknown)  :14       (units    (unkno

wn)



                    date)                                   unknown)  

 

           (unknown)  (no       (unknown)  (unknown)  Abnormal semen  (units    

(unknown)



                    date)                         analysis  unknown)  

 

           (unknown)  (no       (unknown)  (unknown)  Age/Sex: 26 / M  (units   

 (unknown)



                    date)                           Date of unknown)  



                                                  Service:            

 

           (unknown)  (no       (unknown)  (unknown)  Allergies  (units    (unkn

own)



                    date)                                   unknown)  

 

           (unknown)  (no       (unknown)  (unknown)  Assessment +  (units    (u

nknown)



                    date)                         Plan      unknown)  

 

           (unknown)  (no       (unknown)  (unknown)  Attending Dr:  (units    (

unknown)



                    date)                         Rosales Xiao MD unknown)  

 

           (unknown)  (no       (unknown)  (unknown)  BMI    25.7  (units    (un

known)



                    date)                                   unknown)  

 

           (unknown)  (no       (unknown)  (unknown)  BP    131/81  (units    (u

nknown)



                    date)                                   unknown)  

 

           (unknown)  (no       (unknown)  (unknown)  Blood Pressure  (units    

(unknown)



                    date)                         Location    Lt unknown)  



                                                  brachial            

 

           (unknown)  (no       (unknown)  (unknown)  Chief Complaint  (units   

 (unknown)



                    date)                                   unknown)  

 

           (unknown)  (no       (unknown)  (unknown)  Chief Complaint:  (units  

  (unknown)



                    date)                         Abnormal sperm unknown)  



                                                  motility/infertil           



                                                  ity                 

 

           (unknown)  (no       (unknown)  (unknown)  : 1995  (units   

 (unknown)



                    date)                           Acct:[id number] unknown)  

 

           (unknown)  (no       (unknown)  (unknown)  Dept at   (units    (unkno

wn)



                    date)                         [phone]. unknown)  

 

           (unknown)  (no       (unknown)  (unknown)  Details:  (units    (unkno

wn)



                    date)                                   unknown)  

 

           (unknown)  (no       (unknown)  (unknown)  Documented By:  (units    

(unknown)



                    date)                         Rosales Xiao MD unknown)  



                                                   22 1105           

 

           (unknown)  (no       (unknown)  (unknown)  HPI       (units    (unkno

wn)



                    date)                                   unknown)  

 

           (unknown)  (no       (unknown)  (unknown)  Height    6 ft 1  (units  

  (unknown)



                    date)                         in        unknown)  

 

           (unknown)  (no       (unknown)  (unknown)  History of  (units    (unk

nown)



                    date)                         pyloric stenosis unknown)  

 

           (unknown)  (no       (unknown)  (unknown)  Hx of thyroid  (units    (

unknown)



                    date)                         cyst      unknown)  

 

           (unknown)  (no       (unknown)  (unknown)  Intake    (units    (unkno

wn)



                    date)                                   unknown)  

 

           (unknown)  (no       (unknown)  (unknown)  Intake Note:  (units    (u

nknown)



                    date)                                   unknown)  

 

           (unknown)  (no       (unknown)  (unknown)  Intake performed  (units  

  (unknown)



                    date)                         by:       unknown)  



                                                  Gretel Tubbs           

 

           (unknown)  (no       (unknown)  (unknown)  Intake- Clincial  (units  

  (unknown)



                    date)                         Staff     unknown)  

 

           (unknown)  (no       (unknown)  (unknown)  Loc: [facility]  (units    (unkno

wn)



                    date)                                   unknown)  

 

           (unknown)  (no       (unknown)  (unknown)  Medical History  (units   

 (unknown)



                    date)                         (Updated 22 unknown)  



                                                  @ 14:20 by Rosales Xiao MD)           

 

           (unknown)  (no       (unknown)  (unknown)  Medications  (units    (un

known)



                    date)                                   unknown)  

 

           (unknown)  (no       (unknown)  (unknown)  Orders    (units    (unkno

wn)



                    date)                                   unknown)  

 

           (unknown)  (no       (unknown)  (unknown)  Oxygen Delivery  (units   

 (unknown)



                    date)                         Method    room unknown)  



                                                  air                 

 

           (unknown)  (no       (unknown)  (unknown)  PFSH      (units    (unkno

wn)



                    date)                                   unknown)  

 

           (unknown)  (no       (unknown)  (unknown)  POC Urine Dip  (units    (

unknown)



                    date)                         22 R30.0 - unknown)  



                                                  Dysuria             

 

           (unknown)  (no       (unknown)  (unknown)  Patient denies  (units    

(unknown)



                    date)                         medical problems unknown)  

 

           (unknown)  (no       (unknown)  (unknown)  Patient:  (units    (unkno

wn)



                    date)                         Vijaya Cat unknown)  



                                                     MR#: [id number]           

 

           (unknown)  (no       (unknown)  (unknown)  Position  (units    (unkno

wn)



                    date)                         Sitting   unknown)  

 

           (unknown)  (no       (unknown)  (unknown)  Pulse    64  (units    (un

known)



                    date)                                   unknown)  

 

           (unknown)  (no       (unknown)  (unknown)  Pulse Oximetry  (units    

(unknown)



                    date)                         (%)    100 unknown)  

 

           (unknown)  (no       (unknown)  (unknown)  Pulse Source  (units    (u

nknown)



                    date)                         Monitor   unknown)  

 

           (unknown)  (no       (unknown)  (unknown)  Reason For Visit  (units  

  (unknown)



                    date)                                   unknown)  

 

           (unknown)  (no       (unknown)  (unknown)  Respiration  (units    (un

known)



                    date)                         16        unknown)  

 

           (unknown)  (no       (unknown)  (unknown)  Results   (units    (unkno

wn)



                    date)                                   unknown)  

 

           (unknown)  (no       (unknown)  (unknown)  Semen Analysis -  (units  

  (unknown)



                    date)                         Fertility 1 Week unknown)  



                                                  R86.9 -             



                                                  Unspecified           



                                                  abnormal finding           



                                                  in                  

 

           (unknown)  (no       (unknown)  (unknown)  Semen Analysis -  (units  

  (unknown)



                    date)                         Fertility 3 Weeks unknown)  



                                                  R86.9 -             



                                                  Unspecified           



                                                  abnormal finding           



                                                  in                  

 

           (unknown)  (no       (unknown)  (unknown)  Signed By:  (units    (unk

nown)



                    date)                                   unknown)  

 

           (unknown)  (no       (unknown)  (unknown)  Smoking Status:  (units   

 (unknown)



                    date)                         Never smoker unknown)  

 

           (unknown)  (no       (unknown)  (unknown)  Smoking Status:  (units   

 (unknown)



                    date)                         Never smoker unknown)  

 

           (unknown)  (no       (unknown)  (unknown)  Social History  (units    

(unknown)



                    date)                         (Updated 22 unknown)  



                                                  @ 11:00 by Gretel Tubbs RN)           

 

           (unknown)  (no       (unknown)  (unknown)  This 26-year-old  (units  

  (unknown)



                    date)                         male comes unknown)  



                                                  [facility] as           



                                                  a new patient           



                                                  with complaint of           

 

           (unknown)  (no       (unknown)  (unknown)  This note may  (units    (

unknown)



                    date)                         have been all or unknown)  



                                                  partially           



                                                  generated using           



                                                  voice recognition           

 

           (unknown)  (no       (unknown)  (unknown)  Tobacco Status  (units    

(unknown)



                    date)                                   unknown)  

 

           (unknown)  (no       (unknown)  (unknown)  Type(s) of  (units    (unk

nown)



                    date)                         exercise: unknown)  



                                                  independent           



                                                  ambulation           

 

           (unknown)  (no       (unknown)  (unknown)  Urine Dipstick  (units    

(unknown)



                    date)                                   unknown)  

 

           (unknown)  (no       (unknown)  (unknown)  Visit Reasons:  (units    

(unknown)



                    date)                         NP infertility unknown)  

 

           (unknown)  (no       (unknown)  (unknown)  Vitals    (units    (unkno

wn)



                    date)                                   unknown)  

 

           (unknown)  (no       (unknown)  (unknown)  Weight    195 lb  (units  

  (unknown)



                    date)                                   unknown)  

 

           (unknown)  (no       (unknown)  (unknown)  [History  (units    (unkno

wn)



                    date)                         Confirmed unknown)  



                                                  22]           

 

           (unknown)  (no       (unknown)  (unknown)  abnormal sperm  (units    

(unknown)



                    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  



                                                  infertility.  But           



                                                  was review the           



                                                  semen analysis it           

 

           (unknown)  (no       (unknown)  (unknown)  alcohol intake:  (units   

 (unknown)



                    date)                         current   unknown)  

 

           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    

(unknown)



                    date)                         abnormal sperm unknown)  



                                                  morphology and no           



                                                  look perfection.           



                                                  Patient             

 

           (unknown)  (no       (unknown)  (unknown)  been trying in  (units    

(unknown)



                    date)                         the last 6 to 4 unknown)  



                                                  months.  His wife           



                                                  is currently           



                                                  undergoing           

 

           (unknown)  (no       (unknown)  (unknown)  caffeine:  Yes  (units    

(unknown)



                    date)                                   unknown)  

 

           (unknown)  (no       (unknown)  (unknown)  clindamycin  (units    (un

known)



                    date)                         Allergy (Verified unknown)  



                                                  22 11:10)           

 

           (unknown)  (no       (unknown)  (unknown)  evaluation.  She  (units  

  (unknown)



                    date)                         had previously unknown)  



                                                  been on Clomid           



                                                  and they have           



                                                  been unsuccessful           

 

           (unknown)  (no       (unknown)  (unknown)  have occurred.  (units    

(unknown)



                    date)                         If there are any unknown)  



                                                  questions, please           



                                                  contact the           



                                                  Medical Records           

 

           (unknown)  (no       (unknown)  (unknown)  in conceiving to  (units  

  (unknown)



                    date)                         date.     unknown)  

 

           (unknown)  (no       (unknown)  (unknown)  marital status:  (units   

 (unknown)



                    date)                            unknown)  

 

           (unknown)  (no       (unknown)  (unknown)  may occur.  (units    (unk

nown)



                    date)                         Occasional unknown)  



                                                  wrong-word or           



                                                  'sound-alike'           



                                                  substitutions may           



                                                  have                

 

           (unknown)  (no       (unknown)  (unknown)  multivit-min-fol  (units  

  (unknown)



                    date)                         ic-vit K-lycop unknown)  



                                                  [One-A-Day Men's           



                                                  Multivitamin] PO           



                                                  22            

 

           (unknown)  (no       (unknown)  (unknown)  number of  (units    (unkn

own)



                    date)                         children:  0 unknown)  

 

           (unknown)  (no       (unknown)  (unknown)  occupational  (units    (u

nknown)



                    date)                         status:  employed unknown)  

 

           (unknown)  (no       (unknown)  (unknown)  occurred due to  (units   

 (unknown)



                    date)                         the inherent unknown)  



                                                  limitations of           



                                                  voice recognition           



                                                  software. Please           

 

           (unknown)  (no       (unknown)  (unknown)  read the note  (units    (

unknown)



                    date)                         carefully and unknown)  



                                                  recognize, using           



                                                  context, where           



                                                  these               



                                                  substitutions           

 

           (unknown)  (no       (unknown)  (unknown)  reports that it  (units   

 (unknown)



                    date)                         was 3 days of unknown)  



                                                  abstinence prior           



                                                  to the samples.           



                                                  That he and his           

 

           (unknown)  (no       (unknown)  (unknown)  software.  (units    (unkn

own)



                    date)                         Although every unknown)  



                                                  effort is made to           



                                                  edit content,           



                                                  transcription           



                                                  errors              

 

           (unknown)  (no       (unknown)  (unknown)  specimens from  (units    

(unknown)



                    date)                         male genital unknown)  



                                                  organs              

 

           (unknown)  (no       (unknown)  (unknown)  wife have been  (units    

(unknown)



                    date)                         trying to have a unknown)  



                                                  child for           



                                                  approximately           



                                                  year but have           



                                                  really              









                                         Result panel 5









           (unknown)  (no       (unknown)  (unknown)  (no value)  (units    (unk

nown)



                    date)                                   unknown)  

 

           (unknown)  (no       (unknown)  (unknown)  Code(s):  (units    (unkno

wn)



                    date)                                   unknown)  

 

           (unknown)  (no       (unknown)  (unknown)  Orders:   (units    (unkno

wn)



                    date)                                   unknown)  

 

           (unknown)  (no       (unknown)  (unknown)  Status: Acute  (units    (

unknown)



                    date)                                   unknown)  

 

           (unknown)  (no       (unknown)  (unknown)  (no value)  (units    (unk

nown)



                    date)                                   unknown)  

 

           (unknown)  (no       (unknown)  (unknown)  (no value)  (units    (unk

nown)



                    date)                                   unknown)  

 

           (unknown)  (no       (unknown)  (unknown)  22  (units    (unkno

wn)



                    date)                                   unknown)  

 

           (unknown)  (no       (unknown)  (unknown)  22 1123  (units    (

unknown)



                    date)                                   unknown)  

 

           (unknown)  (no       (unknown)  (unknown)  11:15     (units    (unkno

wn)



                    date)                                   unknown)  

 

           (unknown)  (no       (unknown)  (unknown)  [place] [id number]  (unit

s    (unknown)



                    date)                                   unknown)  

 

           (unknown)  (no       (unknown)  (unknown)  [facility]  (units    

(unknown)



                    date)                                   unknown)  

 

           (unknown)  (no       (unknown)  (unknown)  Signed    (units    (unkno

wn)



                    date)                                   unknown)  

 

           (unknown)  (no       (unknown)  (unknown)  Urology Office  (units    

(unknown)



                    date)                         Visit     unknown)  

 

           (unknown)  (no       (unknown)  (unknown)  (no value)  (units    (unk

nown)



                    date)                                   unknown)  

 

           (unknown)  (no       (unknown)  (unknown)  Urine Appearance  (units  

  (unknown)



                    date)                         Clear     Last Edit unknown)  



                                                  by Gabriela Schwartz RN on 22 11:14          

 

 

           (unknown)  (no       (unknown)  (unknown)  Urine Bilirubin  (units   

 (unknown)



                    date)                         Negative     Last unknown)  



                                                  Edit by Gabriela Schwartz RN on           



                                                  22 11:14           

 

           (unknown)  (no       (unknown)  (unknown)  Urine Blood  (units    (un

known)



                    date)                         Negative     Last unknown)  



                                                  Edit by Gabriela Schwartz RN on           



                                                  22 11:14           

 

           (unknown)  (no       (unknown)  (unknown)  Urine Color  (units    (un

known)



                    date)                         Yellow     Last Edit unknown) 

 



                                                  by Gabriela Schwartz RN on 22 11:14          

 

 

           (unknown)  (no       (unknown)  (unknown)  Urine Glucose  (units    (

unknown)



                    date)                         Negative  mg/dL unknown)  



                                                  Last Edit by Gabriela Schwartz RN on           



                                                  22 11:           

 

           (unknown)  (no       (unknown)  (unknown)  Urine Ketones  (units    (

unknown)



                    date)                         Negative     Last unknown)  



                                                  Edit by Gabriela Schwartz RN on           



                                                  22 11:14           

 

           (unknown)  (no       (unknown)  (unknown)  Urine Leukocyte  (units   

 (unknown)



                    date)                         Esterase Negative unknown)  



                                                   Last Edit by Gabriela Schwartz RN on           



                                                                

 

           (unknown)  (no       (unknown)  (unknown)  Urine Nitrate  (units    (

unknown)



                    date)                         Negative     Last unknown)  



                                                  Edit by Gabriela Schwartz RN on           



                                                  22 11:14           

 

           (unknown)  (no       (unknown)  (unknown)  Urine Protein  (units    (

unknown)



                    date)                         Negative     Last unknown)  



                                                  Edit by Gabriela Schwartz RN on           



                                                  22 11:14           

 

           (unknown)  (no       (unknown)  (unknown)  Urine Specific  (units    

(unknown)



                    date)                         Gravity 1.015 unknown)  



                                                  Last Edit by Gabriela Schwartz RN on           



                                                  22 11           

 

           (unknown)  (no       (unknown)  (unknown)  Urine Urobilinogen  (units

    (unknown)



                    date)                         -   0.2 mg/dL unknown)  



                                                  Last Edit by Gabriela Schwartz RN on           



                                                               

 

           (unknown)  (no       (unknown)  (unknown)  Urine pH    7.5  (units   

 (unknown)



                    date)                         Last Edit by Gabriela unknown)  



                                                  PRADIP Schwartz on           



                                                  22 11:14           

 

           (unknown)  (no       (unknown)  (unknown)  (1) Abnormal semen  (units

    (unknown)



                    date)                         analysis: unknown)  

 

           (unknown)  (no       (unknown)  (unknown)  (2) Infertility  (units   

 (unknown)



                    date)                         counseling: unknown)  

 

           (unknown)  (no       (unknown)  (unknown)  (3) Infertility:  (units  

  (unknown)



                    date)                                   unknown)  

 

           (unknown)  (no       (unknown)  (unknown)  [id number]  (units    (unkn

own)



                    date)                                   unknown)  

 

           (unknown)  (no       (unknown)  (unknown)  22  (units    (unkno

wn)



                    date)                                   unknown)  

 

           (unknown)  (no       (unknown)  (unknown)  11:14     (units    (unkno

wn)



                    date)                                   unknown)  

 

           (unknown)  (no       (unknown)  (unknown)  14        (units    (unkno

wn)



                    date)                                   unknown)  

 

           (unknown)  (no       (unknown)  (unknown)  2         (units    (unkno

wn)



                    date)                                   unknown)  

 

           (unknown)  (no       (unknown)  (unknown)  22        (units    (unkno

wn)



                    date)                                   unknown)  

 

           (unknown)  (no       (unknown)  (unknown)  25 Y/O M presents  (units 

   (unknown)



                    date)                         to clinic as new unknown)  



                                                  patient for           



                                                  infertility           

 

           (unknown)  (no       (unknown)  (unknown)  7 days of  (units    (unkn

own)



                    date)                         abstinence prior to unknown)  



                                                  the samples and           



                                                  approximately 2           



                                                  weeks apart and           

 

           (unknown)  (no       (unknown)  (unknown)  :14       (units    (unkno

wn)



                    date)                                   unknown)  

 

           (unknown)  (no       (unknown)  (unknown)  Abdominal exam:  (units   

 (unknown)



                    date)                         Soft, nontender, unknown)  



                                                  without palpable           



                                                  mass or             



                                                  hepatosplenomegaly           

 

           (unknown)  (no       (unknown)  (unknown)  Abnormal semen  (units    

(unknown)



                    date)                         analysis  unknown)  

 

           (unknown)  (no       (unknown)  (unknown)  Age/Sex: 26 / M  (units   

 (unknown)



                    date)                         Date of Service: unknown)  

 

           (unknown)  (no       (unknown)  (unknown)  All systems  (units    (un

known)



                    date)                         reviewed + are unknown)  



                                                  unremarkable except           



                                                  as noted in HPI and           



                                                  below (And           

 

           (unknown)  (no       (unknown)  (unknown)  Allergies  (units    (unkn

own)



                    date)                                   unknown)  

 

           (unknown)  (no       (unknown)  (unknown)  Assessment + Plan  (units 

   (unknown)



                    date)                                   unknown)  

 

           (unknown)  (no       (unknown)  (unknown) Assessment and plan:  (unit

s    (unknown)



                    date)                          Patient with unknown)  



                                                  abnormal semen           



                                                  analysis, apparent           



                                                  infertility           

 

           (unknown)  (no       (unknown)  (unknown)  Attending Dr: Rosales  (unit

s    (unknown)



                    date)                         TERESO Xiao MD unknown)  

 

           (unknown)  (no       (unknown)  (unknown)  BMI    25.7  (units    (un

known)



                    date)                                   unknown)  

 

           (unknown)  (no       (unknown)  (unknown)  BP    131/81  (units    (u

nknown)



                    date)                                   unknown)  

 

           (unknown)  (no       (unknown)  (unknown)  Back:  Without CVA  (units

    (unknown)



                    date)                         or flank tenderness unknown)  

 

           (unknown)  (no       (unknown)  (unknown)  Blood Pressure  (units    

(unknown)



                    date)                         Location    Lt unknown)  



                                                  brachial            

 

           (unknown)  (no       (unknown)  (unknown)  Cardiovascular  (units    

(unknown)



                    date)                         exam:  Regular rate unknown)  



                                                  and rhythm without           



                                                  murmur              

 

           (unknown)  (no       (unknown)  (unknown)  Chief Complaint  (units   

 (unknown)



                    date)                                   unknown)  

 

           (unknown)  (no       (unknown)  (unknown)  Chief Complaint:  (units  

  (unknown)



                    date)                         Abnormal sperm unknown)  



                                                  motility/infertility          

 

 

           (unknown)  (no       (unknown)  (unknown)  Const     (units    (unkno

wn)



                    date)                                   unknown)  

 

           (unknown)  (no       (unknown)  (unknown)  Counseling and  (units    

(unknown)



                    date)                         educating the unknown)  



                                                  patient/family/careg          

 



                                                  iver: 16            

 

           (unknown)  (no       (unknown)  (unknown)  : 1995  (units   

 (unknown)



                    date)                         Acct:[id number] unknown)  

 

           (unknown)  (no       (unknown)  (unknown)  Dept at   (units    (unkno

wn)



                    date)                         [phone]. unknown)  

 

           (unknown)  (no       (unknown)  (unknown)  Details:  (units    (unkno

wn)



                    date)                                   unknown)  

 

           (unknown)  (no       (unknown)  (unknown)  Documented By:  (units    

(unknown)



                    date)                         Rosales Xiao MD unknown)  



                                                  22 1105           

 

           (unknown)  (no       (unknown)  (unknown)  Exam      (units    (unkno

wn)



                    date)                                   unknown)  

 

           (unknown)  (no       (unknown)  (unknown)  Exam Narrative  (units    

(unknown)



                    date)                                   unknown)  

 

           (unknown)  (no       (unknown)  (unknown)  Exam Narrative:  (units   

 (unknown)



                    date)                                   unknown)  

 

           (unknown)  (no       (unknown)  (unknown)  General:  This is  (units 

   (unknown)



                    date)                         an awake, alert, unknown)  



                                                  oriented,           



                                                  fit-appearing           



                                                  26-year-old male in           

 

           (unknown)  (no       (unknown)  (unknown)  Genitourinary exam:  (unit

s    (unknown)



                    date)                         Circumcised penis, unknown)  



                                                  normal meatus,           



                                                  normal penis, normal          

 

 

           (unknown)  (no       (unknown)  (unknown)  HPI       (units    (unkno

wn)



                    date)                                   unknown)  

 

           (unknown)  (no       (unknown)  (unknown)  Height    6 ft 1 in  (unit

s    (unknown)



                    date)                                   unknown)  

 

           (unknown)  (no       (unknown)  (unknown)  Hernias: None  (units    (

unknown)



                    date)                         detected  unknown)  

 

           (unknown)  (no       (unknown)  (unknown)  History of pyloric  (units

    (unknown)



                    date)                         stenosis  unknown)  

 

           (unknown)  (no       (unknown)  (unknown)  Hx of thyroid cyst  (units

    (unknown)



                    date)                                   unknown)  

 

           (unknown)  (no       (unknown)  (unknown)  Independently  (units    (

unknown)



                    date)                         interpreting test unknown)  



                                                  results +           



                                                  communicating           



                                                  results to           

 

           (unknown)  (no       (unknown)  (unknown)  Intake    (units    (unkno

wn)



                    date)                                   unknown)  

 

           (unknown)  (no       (unknown)  (unknown)  Intake Note:  (units    (u

nknown)



                    date)                                   unknown)  

 

           (unknown)  (no       (unknown)  (unknown)  Intake performed  (units  

  (unknown)



                    date)                         by: Gretel Tubbs unknown)  

 

           (unknown)  (no       (unknown)  (unknown)  Intake- Clincial  (units  

  (unknown)



                    date)                         Staff     unknown)  

 

           (unknown)  (no       (unknown)  (unknown)  Loc: URO  (units    (unkno

wn)



                    date)                                   unknown)  

 

           (unknown)  (no       (unknown)  (unknown)  Lungs:  Clear full  (units

    (unknown)



                    date)                         and equal unknown)  

 

           (unknown)  (no       (unknown)  (unknown)  Medical History  (units   

 (unknown)



                    date)                         (Reviewed 22 @ unknown) 

 



                                                  11:19 by Rosales Xiao MD)           

 

           (unknown)  (no       (unknown)  (unknown)  Medications  (units    (un

known)



                    date)                                   unknown)  

 

           (unknown)  (no       (unknown)  (unknown)  Neurologic exam:  (units  

  (unknown)



                    date)                         Grossly intact unknown)  

 

           (unknown)  (no       (unknown)  (unknown)  Obtaining and/or  (units  

  (unknown)



                    date)                         reviewing separately unknown) 

 



                                                  obtained history: 10          

 

 

           (unknown)  (no       (unknown)  (unknown)  Ordering  (units    (unkno

wn)



                    date)                         medications, tests, unknown)  



                                                  or procedures: 4           

 

           (unknown)  (no       (unknown)  (unknown)  Orders    (units    (unkno

wn)



                    date)                                   unknown)  

 

           (unknown)  (no       (unknown)  (unknown)  Oxygen Delivery  (units   

 (unknown)



                    date)                         Method    room air unknown)  

 

           (unknown)  (no       (unknown)  (unknown)  PFSH      (units    (unkno

wn)



                    date)                                   unknown)  

 

           (unknown)  (no       (unknown)  (unknown)  POC Urine Dip  (units    (

unknown)



                    date)                         22 R30.0 - unknown)  



                                                  Dysuria             

 

           (unknown)  (no       (unknown)  (unknown)  Patient denies  (units    

(unknown)



                    date)                         medical problems unknown)  

 

           (unknown)  (no       (unknown)  (unknown)  Patient:  (units    (unkno

wn)



                    date)                         Vijaya Cat unknown)  



                                                  MR#: M              

 

           (unknown)  (no       (unknown)  (unknown)  Performing a  (units    (u

nknown)



                    date)                         medically unknown)  



                                                  appropriate exam           



                                                  and/or evaluation: 5          

 

 

           (unknown)  (no       (unknown)  (unknown)  Plan      (units    (unkno

wn)



                    date)                                   unknown)  

 

           (unknown)  (no       (unknown)  (unknown)  Position    Sitting  (unit

s    (unknown)



                    date)                                   unknown)  

 

           (unknown)  (no       (unknown)  (unknown)  Preparing to see  (units  

  (unknown)



                    date)                         the patient, i.e., unknown)  



                                                  chart review, review          

 



                                                  of tests: 7           

 

           (unknown)  (no       (unknown)  (unknown)  Pulse    64  (units    (un

known)



                    date)                                   unknown)  

 

           (unknown)  (no       (unknown)  (unknown)  Pulse Oximetry (%)  (units

    (unknown)



                    date)                           100     unknown)  

 

           (unknown)  (no       (unknown)  (unknown)  Pulse Source  (units    (u

nknown)



                    date)                         Monitor   unknown)  

 

           (unknown)  (no       (unknown)  (unknown)  R86.9 - Unspecified  (unit

s    (unknown)



                    date)                         abnormal finding in unknown)  



                                                  specimens from male           



                                                  genital organs           

 

           (unknown)  (no       (unknown)  (unknown)  ROS       (units    (unkno

wn)



                    date)                                   unknown)  

 

           (unknown)  (no       (unknown)  (unknown)  Reason For Visit  (units  

  (unknown)



                    date)                                   unknown)  

 

           (unknown)  (no       (unknown)  (unknown)  Respiration    16  (units 

   (unknown)



                    date)                                   unknown)  

 

           (unknown)  (no       (unknown)  (unknown)  Results   (units    (unkno

wn)



                    date)                                   unknown)  

 

           (unknown)  (no       (unknown)  (unknown)  Semen Analysis -  (units  

  (unknown)



                    date)                         Fertility 1 Week unknown)  



                                                  R86.9 - Unspecified           



                                                  abnormal finding in           

 

           (unknown)  (no       (unknown)  (unknown)  Semen Analysis -  (units  

  (unknown)



                    date)                         Fertility 3 Weeks unknown)  



                                                  R86.9 - Unspecified           



                                                  abnormal finding in           

 

           (unknown)  (no       (unknown)  (unknown)  Signed By:  (units    (unk

nown)



                    date)                         <Electronically unknown)  



                                                  signed by Rosales Xiao MD>           

 

           (unknown)  (no       (unknown)  (unknown)  Smoking Status:  (units   

 (unknown)



                    date)                         Never smoker unknown)  

 

           (unknown)  (no       (unknown)  (unknown)  Smoking Status:  (units   

 (unknown)



                    date)                         Never smoker unknown)  

 

           (unknown)  (no       (unknown)  (unknown)  Social History  (units    

(unknown)



                    date)                         (Reviewed 22 @ unknown) 

 



                                                  11:19 by Rosales Xiao MD)           

 

           (unknown)  (no       (unknown)  (unknown)  This 26-year-old  (units  

  (unknown)



                    date)                         male comes Urology unknown)  



                                                  Clinic as a new           



                                                  patient with           



                                                  complaint of           

 

           (unknown)  (no       (unknown)  (unknown)  This note may have  (units

    (unknown)



                    date)                         been all or unknown)  



                                                  partially generated           



                                                  using voice           



                                                  recognition           

 

           (unknown)  (no       (unknown)  (unknown)  Time Coding Minutes  (unit

s    (unknown)



                    date)                         Spent:  (must be on unknown)  



                                                  same date of           



                                                  service/appointment)          

 

 

           (unknown)  (no       (unknown)  (unknown)  Time Spent  (units    (unk

nown)



                    date)                                   unknown)  

 

           (unknown)  (no       (unknown)  (unknown)  Tobacco Status  (units    

(unknown)



                    date)                                   unknown)  

 

           (unknown)  (no       (unknown)  (unknown)  Total Time: 48  (units    

(unknown)



                    date)                                   unknown)  

 

           (unknown)  (no       (unknown)  (unknown)  Type(s) of  (units    (unk

nown)



                    date)                         exercise: unknown)  



                                                  independent           



                                                  ambulation           

 

           (unknown)  (no       (unknown)  (unknown)  Urine Dipstick  (units    

(unknown)



                    date)                                   unknown)  

 

           (unknown)  (no       (unknown)  (unknown)  Visit Reasons: NP  (units 

   (unknown)



                    date)                         infertility unknown)  

 

           (unknown)  (no       (unknown)  (unknown)  Vitals    (units    (unkno

wn)



                    date)                                   unknown)  

 

           (unknown)  (no       (unknown)  (unknown)  Weight    195 lb  (units  

  (unknown)



                    date)                                   unknown)  

 

           (unknown)  (no       (unknown)  (unknown)  Z31.69 - Encounter  (units

    (unknown)



                    date)                         for other general unknown)  



                                                  counseling and           



                                                  advice on           



                                                  procreation           

 

           (unknown)  (no       (unknown)  (unknown)  [History Confirmed  (units

    (unknown)



                    date)                         22] unknown)  

 

           (unknown)  (no       (unknown)  (unknown)  abnormal sperm  (units    

(unknown)



                    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  



                                                  infertility.  But           



                                                  was review the semen          

 



                                                  analysis it           

 

           (unknown)  (no       (unknown)  (unknown)  alcohol intake:  (units   

 (unknown)



                    date)                         current   unknown)  

 

           (unknown)  (no       (unknown)  (unknown)  analysis  (units    (unkno

wn)



                    date)                         specifically looking unknown) 

 



                                                  at Fertility with at          

 



                                                  least 5-7 days of           



                                                  abstinence           

 

           (unknown)  (no       (unknown)  (unknown)  and 10-14 days  (units    

(unknown)



                    date)                         between samples and unknown)  



                                                  then follow-up.           

 

           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    

(unknown)



                    date)                         abnormal sperm unknown)  



                                                  morphology and no           



                                                  look perfection.           



                                                  Patient             

 

           (unknown)  (no       (unknown)  (unknown)  been trying in the  (units

    (unknown)



                    date)                         last 6 to 4 months. unknown)  



                                                  His wife is           



                                                  currently undergoing          

 

 

           (unknown)  (no       (unknown)  (unknown)  bilaterally.  (units    (u

nknown)



                    date)                                   unknown)  

 

           (unknown)  (no       (unknown)  (unknown)  caffeine:  Yes  (units    

(unknown)



                    date)                                   unknown)  

 

           (unknown)  (no       (unknown)  (unknown)  clindamycin Allergy  (unit

s    (unknown)



                    date)                         (Verified 22 unknown)  



                                                  11:10)              

 

           (unknown)  (no       (unknown)  (unknown)  evaluation.  She  (units  

  (unknown)



                    date)                         had previously been unknown)  



                                                  on Clomid and they           



                                                  have been           



                                                  unsuccessful           

 

           (unknown)  (no       (unknown)  (unknown)  fathered no  (units    (un

known)



                    date)                         children.  Had a unknown)  



                                                  lengthy discussion           



                                                  with him about           



                                                  fertility the           

 

           (unknown)  (no       (unknown)  (unknown)  have occurred.  If  (units

    (unknown)



                    date)                         there are any unknown)  



                                                  questions, please           



                                                  contact the Medical           



                                                  Records             

 

           (unknown)  (no       (unknown)  (unknown)  in conceiving to  (units  

  (unknown)



                    date)                         date.  They have unknown)  



                                                  been instructed on           



                                                  monitoring the           



                                                  timing of           

 

           (unknown)  (no       (unknown)  (unknown)  marijuana or other  (units

    (unknown)



                    date)                         illicit substances unknown)  



                                                  he does drink some           



                                                  alcohol.  He denies           

 

           (unknown)  (no       (unknown)  (unknown)  marital status:  (units   

 (unknown)



                    date)                            unknown)  

 

           (unknown)  (no       (unknown)  (unknown)  may occur.  (units    (unk

nown)



                    date)                         Occasional unknown)  



                                                  wrong-word or           



                                                  'sound-alike'           



                                                  substitutions may           



                                                  have                

 

           (unknown)  (no       (unknown)  (unknown)  multivit-min-folic-  (unit

s    (unknown)



                    date)                         vit K-lycop unknown)  



                                                  [One-A-Day Men's           



                                                  Multivitamin] PO           



                                                  22            

 

           (unknown)  (no       (unknown)  (unknown)  natural history of  (units

    (unknown)



                    date)                         sperm formation and unknown)  



                                                  briefly the the           



                                                  various multiple           



                                                  causes              

 

           (unknown)  (no       (unknown)  (unknown)  no acute distress  (units 

   (unknown)



                    date)                                   unknown)  

 

           (unknown)  (no       (unknown)  (unknown)  number of children:  (unit

s    (unknown)



                    date)                          0        unknown)  

 

           (unknown)  (no       (unknown)  (unknown)  occupational  (units    (u

nknown)



                    date)                         status:  employed unknown)  

 

           (unknown)  (no       (unknown)  (unknown)  occurred due to the  (unit

s    (unknown)



                    date)                         inherent limitations unknown) 

 



                                                  of voice recognition          

 



                                                  software. Please           

 

           (unknown)  (no       (unknown)  (unknown)  of male factor  (units    

(unknown)



                    date)                         infertility.  At unknown)  



                                                  this point plan           



                                                  would be to repeat           



                                                  his semen           

 

           (unknown)  (no       (unknown)  (unknown)  ovulation which  (units   

 (unknown)



                    date)                         they had not been unknown)  



                                                  instructed in.           



                                                  Patient denies use           



                                                  of                  

 

           (unknown)  (no       (unknown)  (unknown)  ovulation.  I  (units    (

unknown)



                    date)                         suggested unknown)  



                                                  instructions for the          

 



                                                  timing of sexual           



                                                  activity around           

 

           (unknown)  (no       (unknown)  (unknown)  patient/caregiver:  (units

    (unknown)



                    date)                         6         unknown)  

 

           (unknown)  (no       (unknown)  (unknown) plan would be repeat  (unit

s    (unknown)



                    date)                         semen analysis unknown)  



                                                  specifically           



                                                  focusing on           



                                                  infertility with 5-           

 

           (unknown)  (no       (unknown)  (unknown)  problem list)  (units    (

unknown)



                    date)                                   unknown)  

 

           (unknown)  (no       (unknown)  (unknown)  read the note  (units    (

unknown)



                    date)                         carefully and unknown)  



                                                  recognize, using           



                                                  context, where these          

 



                                                  substitutions           

 

           (unknown)  (no       (unknown)  (unknown)  reports that it was  (unit

s    (unknown)



                    date)                         3 days of abstinence unknown) 

 



                                                  prior to the           



                                                  samples.  That he           



                                                  and his             

 

           (unknown)  (no       (unknown)  (unknown)  scrotum, normal  (units   

 (unknown)



                    date)                         testes, normal unknown)  



                                                  epididymis, normal           



                                                  cord structures, vas          

 



                                                  present             

 

           (unknown)  (no       (unknown)  (unknown)  significant  (units    (un

known)



                    date)                         exposure to air unknown)  



                                                  medic or volatile           



                                                  compound.  He denies          

 



                                                  trauma, he           

 

           (unknown)  (no       (unknown)  (unknown)  software. Although  (units

    (unknown)



                    date)                         every effort is made unknown) 

 



                                                  to edit content,           



                                                  transcription errors          

 

 

           (unknown)  (no       (unknown)  (unknown)  specimens from male  (unit

s    (unknown)



                    date)                         genital organs unknown)  

 

           (unknown)  (no       (unknown)  (unknown)  then follow-up.  (units   

 (unknown)



                    date)                                   unknown)  

 

           (unknown)  (no       (unknown)  (unknown)  trauma, previous  (units  

  (unknown)



                    date)                         surgery on the unknown)  



                                                  testicles or           



                                                  scrotum.  He has           



                                                  previously           

 

           (unknown)  (no       (unknown)  (unknown)  wife have been  (units    

(unknown)



                    date)                         trying to have a unknown)  



                                                  child for           



                                                  approximately year           



                                                  but have really           









                                         Result panel 6









           (unknown)  (no date)  (unknown)  (unknown)  32        %ABNORM   (unkn

own)

 

           (unknown)  (no date)  (unknown)  (unknown)  4         (units unknown)

  (unknown)

 

           (unknown)  (no date)  (unknown)  (unknown)  65%       % Motile  (unkn

own)

 

           (unknown)  (no date)  (unknown)  (unknown)  70        x10 6/mL  (unkn

own)

 

           (unknown)  (no date)  (unknown)  (unknown)  8         (units unknown)

  (unknown)

 

           (unknown)  (no date)  (unknown)  (unknown)  YES       (units unknown)

  (unknown)







Social History







                     date                description         facility

 

                     (no date)           Never smoked tobacco (finding)  [facility]







Vital Signs







                 date            measurement     value           units









              +0000  BMI          BMI          25.7         kg/m2

 

              +0000  BP_diastolic  BP_diastolic  81           mm[H

g]

 

              +0000  BP_systolic  BP_systolic  131          mm[Hg]

 

              [id number]  heart_rate   heart_rate   64           /min

 

              +0000  height_metric  height_metric  185.42       cm

 

              +0000  height_standard  height_standard  73         

  in

 

              +0000  respiration_rate  respiration_rate  16       

    /min

 

              [id number]+0000  weight_metric  weight_metric  88.45        kg

 

              [id number]+0000  weight_standard  weight_standard  195        

  lb

## 2022-07-25 NOTE — ED PHYSICIAN DOCUMENTATION
PD HPI LOWER EXT INJURY





- Stated complaint


Stated Complaint: R KNEE SWELLING





- Chief complaint


Chief Complaint: Ext Problem





- History obtained from


History obtained from: Patient





- History of Present Illness


PD HPI LOW EXT INJURY LOCATION: Right, Knee


Type of injury: No: Fall, Twist, Blunt / blow


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 

kneeling alot. No particular injury. has had swelling anterior knee the past few

days again, with similar episode about 5 weeks ago. Drained at that visit and 

was good for a month. No regular knee pains, nor locking/clicking/giving out.)


Timing - onset: How many days ago (recurrent swelling past few days. Had similar

5 weeks ago.)


Timing - duration: Days


Timing - details: Abrupt onset, Still present


Associated symptoms: Swelling.  No: Weakness, Numbness, Discolored


Similar symptoms before: Diagnosis (prepatellar bursitis without apparent acute 

injury. Fluid eval prior visit negative for infection and crystals.)





Review of Systems


Constitutional: denies: Fever, Chills


Skin: denies: Rash, Lesions, Abrasion (s), Laceration (s)


Neurologic: denies: Focal weakness, Numbness





PD PAST MEDICAL HISTORY





- Past Medical History


Past Medical History: No


Musculoskeletal: None





- Past Surgical History


Past Surgical History: Yes





- Present Medications


Home Medications: 


                                Ambulatory Orders











 Medication  Instructions  Recorded  Confirmed


 


No Known Home Medications  06/09/22 07/25/22














- Allergies


Allergies/Adverse Reactions: 


                                    Allergies











Allergy/AdvReac Type Severity Reaction Status Date / Time


 


clindamycin Allergy Severe Nausea Verified 07/25/22 14:11














- Social History


Does the pt smoke?: No


Smoking Status: Never smoker


Does the pt drink ETOH?: Yes


Does the pt have substance abuse?: No





- Immunizations


Immunizations are current?: Yes





- POLST


Patient has POLST: No





PD ED PE NORMAL





- Vitals


Vital signs reviewed: Yes





- General


General: Alert and oriented X 3, No acute distress, Well developed/nourished





- Derm


Derm: Normal color, Warm and dry, No rash





- Extremities


Extremities: Other (right knee with anterior effusion. No joint effusion itself.

Minimal bursal tenderness. Quads extension wtihout pain. Not tender proximal 

tibia. )





- Neuro


Neuro: Alert and oriented X 3, No motor deficit, No sensory deficit





Results





- Vitals


Vitals: 


                               Vital Signs - 24 hr











  07/25/22 07/25/22





  14:07 16:41


 


Temperature 36.5 C 


 


Heart Rate 64 76


 


Respiratory 16 16





Rate  


 


Blood Pressure 143/91 H 145/89 H


 


O2 Saturation 99 99








                                     Oxygen











O2 Source                      Room air

















Procedures





- Arthrocentesis


Joint: Knee


Preparation: Consent obtained (verbal), Sterile prep and drape


Anesthesia: Lidocaine 1%


Fluid: Bloody, Clear, Fluid obtained - cc (16)


Aftercare: Dressing applied, No complications, Patient tolerated well





PD MEDICAL DECISION MAKING





- ED course


Complexity details: reviewed old records, considered differential 

(patellofemoral syndrom with irritation and effusion versus ptellar tendonitis. 

Does not seem gout. ), d/w patient





Departure





- Departure


Disposition: 01 Home, Self Care


Clinical Impression: 


 Prepatellar effusion of right knee, Prepatellar bursitis





Condition: Stable


Record reviewed to determine appropriate education?: Yes


Instructions:  Patellofemoral Syndrome


Follow-Up: 


[facility] Echavarria [Primary Care Provider] - 


 [facility] [Provider Group]


Comments: 


For tonight and possibly tomorrow use a wrap over the need to help reduce the 

sooner refilling of the fluid.





Normal activity starting tomorrow was okay.





I wonder if the cause of this is either a direct irritation of the knee and 

bursa by kneeling and such so perhaps kneepads when you are working.  

Alternatively you can get irritation of the bursa with the kneecap moving around

excessively so consider use of a sleeve type knee brace that does not cover the 

kneecap.  This tries to hold the kneecap in better position when you are doing 

activity.





Tylenol or ibuprofen if needed for pains.





Follow-up with your primary care or more pertinent would be the orthopedic 

office if you keep having recurrences.


Discharge Date/Time: 07/25/22 17:09

## 2022-07-25 NOTE — XRAY REPORT
PROCEDURE:  Knee 4 View RT

 

INDICATIONS:  Trauma

 

TECHNIQUE:  4 views of the right knee(s) were acquired.  

 

COMPARISON:  None.

 

FINDINGS:  

 

Bones:  No fractures or dislocations.  No suspicious bony lesions.  

 

Soft tissues:  No joint effusion.  No suspicious soft tissue calcifications.  Prepatellar swelling an
d fluid.

 

IMPRESSION:  Large prepatellar swelling/fluid, possibly prepatellar bursitis. No acute osseous abnorm
ality.

 

Reviewed by: Trev Villalba MD on 7/25/2022 2:37 PM PDT

Approved by: Trev Villalba MD on 7/25/2022 2:37 PM PDT

 

 

Station ID:  [id number]

## 2023-02-07 ENCOUNTER — HOSPITAL ENCOUNTER (OUTPATIENT)
Dept: HOSPITAL 76 - DI | Age: 28
Discharge: HOME | End: 2023-02-07
Attending: NURSE PRACTITIONER
Payer: COMMERCIAL

## 2023-02-07 DIAGNOSIS — M77.9: Primary | ICD-10-CM

## 2023-02-07 DIAGNOSIS — R22.32: ICD-10-CM

## 2023-02-07 NOTE — ULTRASOUND REPORT
PROCEDURE:  Duplex Ext Veins Left

 

INDICATIONS:  TENDINITIS

 

TECHNIQUE:  

Real-time imaging, as well as color and pulse Doppler interrogation, was performed of the left upper 
extremity deep veins from the inferior neck to the antecubital fossa.  

 

COMPARISON:  None.

 

FINDINGS:  The internal jugular vein, visualized portions of the subclavian vein, axillary, and brach
ial veins are free of intraluminal thrombus.  Where physically possible, the veins are normally compr
essible.  Color and pulse Doppler demonstrate normal intraluminal flow, with expected phasicity and p
ulsatility.  Additional scanning of the cephalic and basilic veins of the superficial system demonstr
ate normal compressibility, without thrombus.  

 

Evaluation in the area of clinical concern in the forearm demonstrates no discrete subjacent mass or 
fluid collection.

 

IMPRESSION:  

 

1. No evidence of deep venous thrombosis in the left upper extremity.

 

Reviewed by: Thomas Downing MD on 2/7/2023 9:03 PM PST

Approved by: Thomas Downing MD on 2/7/2023 9:03 PM [facility]

 

 

Station ID:  [id number]

## 2023-02-07 NOTE — XRAY REPORT
PROCEDURE:  Wrist 3 View LT

 

INDICATIONS: TENDINITIS

 

TECHNIQUE:  4 views of the wrist were acquired.  

 

COMPARISON:  None

 

FINDINGS:  

 

Bones:  No fractures or dislocations.  No suspicious bony lesions.  

 

Scaphoid view:  Scaphoid is intact.

 

Soft tissues:  No suspicious soft tissue calcifications.  

 

IMPRESSION:  

No wrist fracture or dislocation. No gross soft tissue abnormalities. If indicated, MRI of wrist can 
be done for further evaluation of internal derangement.

 

Reviewed by: Abiodun Escobar MD on 2/7/2023 8:21 PM PST

Approved by: Abiodun Escobar MD on 2/7/2023 8:21 PM PST

 

 

Station ID:  [id number]

## 2023-10-17 ENCOUNTER — HOSPITAL ENCOUNTER (EMERGENCY)
Dept: HOSPITAL 76 - ED | Age: 28
Discharge: HOME | End: 2023-10-17
Payer: COMMERCIAL

## 2023-10-17 VITALS — OXYGEN SATURATION: 100 %

## 2023-10-17 VITALS — SYSTOLIC BLOOD PRESSURE: 133 MMHG | DIASTOLIC BLOOD PRESSURE: 95 MMHG
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
## 2023-10-17 DIAGNOSIS — S20.211A: Primary | ICD-10-CM
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
## 2023-10-17 PROCEDURE — 96372 THER/PROPH/DIAG INJ SC/IM: CPT

## 2023-10-17 PROCEDURE — 71101 X-RAY EXAM UNILAT RIBS/CHEST: CPT

## 2023-10-17 PROCEDURE — 99283 EMERGENCY DEPT VISIT LOW MDM: CPT

## 2023-10-17 NOTE — XRAY REPORT
PROCEDURE:  Ribs w/PA Chest RT

 

INDICATIONS:  GLF, RIB PAIN/INJURY

 

TECHNIQUE:  2 views of the right ribs were acquired, along with a single view chest.  

 

COMPARISON:  None.

 

FINDINGS:  

 

Surgical changes and devices:  None.  

 

Bones and chest wall:  No fractures or dislocations.  No suspicious bony lesions.  Overlying soft tis
sues appear unremarkable.  

 

Lungs and pleura:  No pleural effusions or pneumothorax.  Lungs appear clear.  

 

Mediastinum:  Mediastinal contours appear normal.  Heart size is normal.  

 

 

IMPRESSION:  

No displaced rib fracture or pneumothorax.

 

 

 

Reviewed by: Abiodun Escobar MD on 10/17/2023 10:20 AM PDT

Approved by: Abiodun Escobar MD on 10/17/2023 10:20 AM PDT

 

 

Station ID:  [id number]

## 2023-10-17 NOTE — ED PHYSICIAN DOCUMENTATION
History of Present Illness





- Stated complaint


Stated Complaint: RT ABD PX,SOA





- Chief complaint


Chief Complaint: Trauma Ch/Bk





- History obtained from


History obtained from: Patient





- History of Present Illness


Timing: How many days ago (2)





- Additonal information


Additional information: 





27-year-old male with no significant past medical history presents for right-

sided chest wall pain for the last 2 days.  Patient was taking trash out when he

slipped, striking his right chest wall against the tailgate of his truck.  He 

noticed last night that it was progressively more difficult to breathe due to 

pain and so he decided to present for evaluation.





Review of Systems


Constitutional: denies: Fever, Chills


Cardiac: reports: Other (chest wall pain).  denies: Chest pain / pressure, 

Palpitations


Respiratory: reports: Dyspnea.  denies: Cough, Wheezing


GI: denies: Abdominal Pain, Nausea, Vomiting





PD PAST MEDICAL HISTORY





- Past Medical History


Musculoskeletal: None





- Past Surgical History


Past Surgical History: Yes





- Present Medications


Home Medications: 


                                Ambulatory Orders











 Medication  Instructions  Recorded  Confirmed


 


Naproxen 250 mg PO BID #30 tablet 10/17/23 


 


methocarbamoL [Robaxin] 500 mg PO Q6H #20 tablet 10/17/23 














- Allergies


Allergies/Adverse Reactions: 


                                    Allergies











Allergy/AdvReac Type Severity Reaction Status Date / Time


 


clindamycin Allergy Severe Nausea Verified 10/17/23 09:50














- Social History


Does the pt smoke?: No


Smoking Status: Never smoker


Does the pt drink ETOH?: Yes


Does the pt have substance abuse?: No





- Immunizations


Immunizations are current?: Yes





- POLST


Patient has POLST: No





PD ED PE NORMAL





- Vitals


Vital signs reviewed: Yes





- General


General: Alert and oriented X 3, No acute distress, Well developed/nourished





- HEENT


HEENT: Atraumatic





- Neck


Neck: Supple, no meningeal sign





- Cardiac


Cardiac: RRR, Strong equal pulses





- Respiratory


Respiratory: No respiratory distress, Clear bilaterally, Other (Chest wall 

tenderness to palpation, R midclavicular line. No crepitus)





- Abdomen


Abdomen: Soft, Non tender





- Derm


Derm: Normal color, Warm and dry, No rash





- Neuro


Neuro: Alert and oriented X 3, CNs 2-12 intact, No motor deficit, Normal speech





- Psych


Psych: Normal mood, Normal affect





Results





- Vitals


Vitals: 


                               Vital Signs - 24 hr











  10/17/23 10/17/23





  09:50 12:20


 


Temperature 36 C L 36.8 C


 


Heart Rate 72 64


 


Respiratory 16 16





Rate  


 


Blood Pressure 137/94 H 133/95 H


 


O2 Saturation 100 100








                                     Oxygen











O2 Source                      Room air

















PD Medical Decision Making





- ED course


Complexity details: reviewed results, re-evaluated patient, considered 

differential, d/w patient


ED course: 





Chest wall pain after slipping and striking his chest against the tailgate of 

his truck.  No crepitus, no deformity, lungs are clear to auscultation 

bilaterally. XR negative for fracture. Patient counseled on importance of taking

 deep breaths to prevent developing atelectasis and subsequent pneumonia.  Given

 Toradol and a lidocaine patch in the emergency department, discharged with 

anti-inflammatories and Robaxin. Note for work provided.





Departure





- Departure


Disposition: 01 Home, Self Care


Clinical Impression: 


Rib contusion


Qualifiers:


 Encounter type: initial encounter Laterality: right Qualified Code(s): S20.211A

 - Contusion of right front wall of thorax, initial encounter





Condition: Stable


Instructions:  ED Contusion Rib


Prescriptions: 


Naproxen 250 mg PO BID #30 tablet


methocarbamoL [Robaxin] 500 mg PO Q6H #20 tablet


Forms:  PCP List


Discharge Date/Time: 10/17/23 12:24